# Patient Record
Sex: MALE | Race: WHITE | NOT HISPANIC OR LATINO | Employment: FULL TIME | URBAN - METROPOLITAN AREA
[De-identification: names, ages, dates, MRNs, and addresses within clinical notes are randomized per-mention and may not be internally consistent; named-entity substitution may affect disease eponyms.]

---

## 2017-05-22 ENCOUNTER — PATIENT OUTREACH (OUTPATIENT)
Dept: ADMINISTRATIVE | Facility: HOSPITAL | Age: 52
End: 2017-05-22

## 2017-06-01 ENCOUNTER — OFFICE VISIT (OUTPATIENT)
Dept: INTERNAL MEDICINE | Facility: CLINIC | Age: 52
End: 2017-06-01
Payer: COMMERCIAL

## 2017-06-01 VITALS — HEIGHT: 71 IN | TEMPERATURE: 98 F | WEIGHT: 180.31 LBS | BODY MASS INDEX: 25.24 KG/M2

## 2017-06-01 DIAGNOSIS — Z12.11 COLON CANCER SCREENING: ICD-10-CM

## 2017-06-01 DIAGNOSIS — J30.9 ALLERGIC RHINITIS, UNSPECIFIED ALLERGIC RHINITIS TRIGGER, UNSPECIFIED RHINITIS SEASONALITY: ICD-10-CM

## 2017-06-01 DIAGNOSIS — Z00.00 ROUTINE GENERAL MEDICAL EXAMINATION AT A HEALTH CARE FACILITY: Primary | ICD-10-CM

## 2017-06-01 DIAGNOSIS — A60.00 GENITAL HERPES SIMPLEX, UNSPECIFIED SITE: ICD-10-CM

## 2017-06-01 DIAGNOSIS — Z12.5 PROSTATE CANCER SCREENING: ICD-10-CM

## 2017-06-01 PROCEDURE — 99999 PR PBB SHADOW E&M-EST. PATIENT-LVL II: CPT | Mod: PBBFAC,,, | Performed by: FAMILY MEDICINE

## 2017-06-01 PROCEDURE — 99396 PREV VISIT EST AGE 40-64: CPT | Mod: S$GLB,,, | Performed by: FAMILY MEDICINE

## 2017-06-01 RX ORDER — FLUTICASONE PROPIONATE 50 MCG
2 SPRAY, SUSPENSION (ML) NASAL DAILY
Qty: 1 BOTTLE | Refills: 11 | Status: SHIPPED | OUTPATIENT
Start: 2017-06-01 | End: 2019-08-13 | Stop reason: SDUPTHER

## 2017-06-01 RX ORDER — FLUTICASONE PROPIONATE 50 MCG
2 SPRAY, SUSPENSION (ML) NASAL DAILY
Qty: 1 BOTTLE | Refills: 0 | Status: SHIPPED | OUTPATIENT
Start: 2017-06-01 | End: 2017-06-01 | Stop reason: SDUPTHER

## 2017-06-01 RX ORDER — VALACYCLOVIR HYDROCHLORIDE 500 MG/1
500 TABLET, FILM COATED ORAL 2 TIMES DAILY
Qty: 60 TABLET | Refills: 11 | Status: SHIPPED | OUTPATIENT
Start: 2017-06-01 | End: 2019-08-13 | Stop reason: DRUGHIGH

## 2017-06-01 NOTE — PROGRESS NOTES
Subjective:      Patient ID: Flex Curtis is a 51 y.o. male.    Chief Complaint: Annual Exam    Patient is a 51-year-old white male coming in today to establish care as well as to have a prevention exam and to do an eye doctor physical.  He has a history of seasonal ALLERGIES which are controlled with Zyrtec and Flonase as well as genital herpes which are controlled on a when necessary basis with Valtrex.  He also has a history of low HDL cholesterol.  He normally does a wellness visit with his [facility] physical.  He recently did some bloodwork that was nonfasting which showed a total cholesterol 176 HDL 23 triglycerides at 286 and LDL at 96 of glucose at 1:15 a BMI 24 blood pressure 118/84.  They currently have an adopted son but they're looking to get a girl between the ages of 47.  He works currently as a .  He is needing to update a colonoscopy.        Lab Results   Component Value Date    WBC 5.45 09/03/2015    HGB 14.3 09/03/2015    HCT 42.0 09/03/2015     09/03/2015    CHOL 173 09/03/2015    TRIG 151 (H) 09/03/2015    HDL 36 (L) 09/03/2015    ALT 18 09/03/2015    AST 15 09/03/2015     09/03/2015    K 3.6 09/03/2015     09/03/2015    CREATININE 1.2 09/03/2015    BUN 20 09/03/2015    CO2 26 09/03/2015    TSH 1.581 09/03/2015    HGBA1C 5.3 09/03/2015       Review of Systems   Constitutional: Negative for chills, fatigue and unexpected weight change.   HENT: Negative for congestion, ear pain, postnasal drip, sneezing, sore throat and trouble swallowing.    Eyes: Negative for pain and visual disturbance.   Respiratory: Negative for cough and shortness of breath.    Cardiovascular: Negative for chest pain and leg swelling.   Gastrointestinal: Negative for abdominal pain, constipation, diarrhea, nausea and vomiting.   Endocrine: Negative for cold intolerance and heat intolerance.   Genitourinary: Negative for difficulty urinating, dysuria and flank pain.    Musculoskeletal: Negative for arthralgias, back pain, joint swelling and neck pain.   Skin: Negative for color change and rash.   Neurological: Negative for dizziness, seizures and headaches.   Psychiatric/Behavioral: Negative for behavioral problems and dysphoric mood.     Objective:     Physical Exam   Constitutional: He is oriented to person, place, and time. He appears well-developed and well-nourished. No distress.   HENT:   Head: Normocephalic and atraumatic.   Right Ear: External ear normal.   Left Ear: External ear normal.   Nose: Nose normal.   Mouth/Throat: Oropharynx is clear and moist.   Eyes: EOM are normal. Pupils are equal, round, and reactive to light.   Neck: Normal range of motion. Neck supple. No thyromegaly present.   Cardiovascular: Normal rate and regular rhythm.  Exam reveals no gallop and no friction rub.    No murmur heard.  Pulmonary/Chest: Effort normal and breath sounds normal. No respiratory distress.   Abdominal: Soft. Bowel sounds are normal. He exhibits no distension. There is no tenderness. There is no rebound.   Musculoskeletal: Normal range of motion.   Lymphadenopathy:     He has no cervical adenopathy.   Neurological: He is alert and oriented to person, place, and time. Coordination normal.   Skin: Skin is warm and dry.   Psychiatric: He has a normal mood and affect.   Vitals reviewed.    Assessment:     1. Routine general medical examination at a health care facility    2. Colon cancer screening    3. Prostate cancer screening    4. Allergic rhinitis, unspecified allergic rhinitis trigger, unspecified rhinitis seasonality    5. Genital herpes simplex, unspecified site      Plan:   Flex was seen today for annual exam.    Diagnoses and all orders for this visit:    Routine general medical examination at a health care facility-discussed health maintenance issues completed form okay to proceed forward the adoption process we'll obtain lab work next week work on HDL.  -     Lipid  panel; Future  -     Comprehensive metabolic panel; Future  -     TSH; Future  -     CBC auto differential; Future  -     PSA, Screening; Future  -     Case request GI: COLONOSCOPY    Colon cancer screening-schedule colonoscopy  -     Case request GI: COLONOSCOPY    Prostate cancer screening  -     PSA, Screening; Future    Recurrent ALLERGIES-refilled Flonase and getting Zyrtec over-the-counter.    Genital herpes with when necessary outbreaks has Valtrex to use as needed.  -     Discontinue: fluticasone (FLONASE) 50 mcg/actuation nasal spray; 2 sprays by Each Nare route once daily.  -     fluticasone (FLONASE) 50 mcg/actuation nasal spray; 2 sprays by Each Nare route once daily.    Other orders  -     valacyclovir (VALTREX) 500 MG tablet; Take 1 tablet (500 mg total) by mouth 2 (two) times daily.            Return in about 1 year (around 6/1/2018) for physical.

## 2017-08-18 ENCOUNTER — HOSPITAL ENCOUNTER (OUTPATIENT)
Facility: HOSPITAL | Age: 52
Discharge: HOME | End: 2017-08-18
Attending: INTERNAL MEDICINE | Admitting: INTERNAL MEDICINE
Payer: COMMERCIAL

## 2017-08-18 ENCOUNTER — ANESTHESIA (OUTPATIENT)
Dept: ENDOSCOPY | Facility: HOSPITAL | Age: 52
End: 2017-08-18
Payer: COMMERCIAL

## 2017-08-18 ENCOUNTER — ANESTHESIA EVENT (OUTPATIENT)
Dept: ENDOSCOPY | Facility: HOSPITAL | Age: 52
End: 2017-08-18
Payer: COMMERCIAL

## 2017-08-18 ENCOUNTER — SURGERY (OUTPATIENT)
Age: 52
End: 2017-08-18

## 2017-08-18 VITALS
HEIGHT: 71 IN | WEIGHT: 173 LBS | HEART RATE: 69 BPM | TEMPERATURE: 98 F | SYSTOLIC BLOOD PRESSURE: 117 MMHG | DIASTOLIC BLOOD PRESSURE: 80 MMHG | RESPIRATION RATE: 18 BRPM | BODY MASS INDEX: 24.22 KG/M2 | OXYGEN SATURATION: 98 %

## 2017-08-18 DIAGNOSIS — Z12.12 SCREENING FOR COLORECTAL CANCER: ICD-10-CM

## 2017-08-18 DIAGNOSIS — Z12.11 SCREENING FOR COLORECTAL CANCER: ICD-10-CM

## 2017-08-18 PROCEDURE — 63600175 PHARM REV CODE 636 W HCPCS: Performed by: NURSE ANESTHETIST, CERTIFIED REGISTERED

## 2017-08-18 PROCEDURE — 88305 TISSUE EXAM BY PATHOLOGIST: CPT | Performed by: PATHOLOGY

## 2017-08-18 PROCEDURE — 37000008 HC ANESTHESIA 1ST 15 MINUTES: Performed by: INTERNAL MEDICINE

## 2017-08-18 PROCEDURE — 27201012 HC FORCEPS, HOT/COLD, DISP: Performed by: INTERNAL MEDICINE

## 2017-08-18 PROCEDURE — 45380 COLONOSCOPY AND BIOPSY: CPT | Mod: 33,,, | Performed by: INTERNAL MEDICINE

## 2017-08-18 PROCEDURE — 25000003 PHARM REV CODE 250: Performed by: INTERNAL MEDICINE

## 2017-08-18 PROCEDURE — 37000009 HC ANESTHESIA EA ADD 15 MINS: Performed by: INTERNAL MEDICINE

## 2017-08-18 PROCEDURE — 45380 COLONOSCOPY AND BIOPSY: CPT | Performed by: INTERNAL MEDICINE

## 2017-08-18 PROCEDURE — 25000003 PHARM REV CODE 250: Performed by: NURSE ANESTHETIST, CERTIFIED REGISTERED

## 2017-08-18 PROCEDURE — 88305 TISSUE EXAM BY PATHOLOGIST: CPT | Mod: 26,,, | Performed by: PATHOLOGY

## 2017-08-18 RX ORDER — SODIUM CHLORIDE, SODIUM LACTATE, POTASSIUM CHLORIDE, CALCIUM CHLORIDE 600; 310; 30; 20 MG/100ML; MG/100ML; MG/100ML; MG/100ML
INJECTION, SOLUTION INTRAVENOUS CONTINUOUS PRN
Status: DISCONTINUED | OUTPATIENT
Start: 2017-08-18 | End: 2017-08-18

## 2017-08-18 RX ORDER — SODIUM CHLORIDE, SODIUM LACTATE, POTASSIUM CHLORIDE, CALCIUM CHLORIDE 600; 310; 30; 20 MG/100ML; MG/100ML; MG/100ML; MG/100ML
INJECTION, SOLUTION INTRAVENOUS CONTINUOUS
Status: DISCONTINUED | OUTPATIENT
Start: 2017-08-18 | End: 2017-08-18 | Stop reason: HOSPADM

## 2017-08-18 RX ORDER — LIDOCAINE HCL/PF 100 MG/5ML
SYRINGE (ML) INTRAVENOUS
Status: DISCONTINUED | OUTPATIENT
Start: 2017-08-18 | End: 2017-08-18

## 2017-08-18 RX ORDER — PROPOFOL 10 MG/ML
INJECTION, EMULSION INTRAVENOUS
Status: DISCONTINUED | OUTPATIENT
Start: 2017-08-18 | End: 2017-08-18

## 2017-08-18 RX ADMIN — PROPOFOL 30 MG: 10 INJECTION, EMULSION INTRAVENOUS at 09:08

## 2017-08-18 RX ADMIN — SODIUM CHLORIDE, SODIUM LACTATE, POTASSIUM CHLORIDE, AND CALCIUM CHLORIDE: .6; .31; .03; .02 INJECTION, SOLUTION INTRAVENOUS at 09:08

## 2017-08-18 RX ADMIN — SODIUM CHLORIDE, SODIUM LACTATE, POTASSIUM CHLORIDE, AND CALCIUM CHLORIDE: 600; 310; 30; 20 INJECTION, SOLUTION INTRAVENOUS at 09:08

## 2017-08-18 RX ADMIN — PROPOFOL 140 MG: 10 INJECTION, EMULSION INTRAVENOUS at 09:08

## 2017-08-18 RX ADMIN — LIDOCAINE HYDROCHLORIDE 100 MG: 20 INJECTION, SOLUTION INTRAVENOUS at 09:08

## 2017-08-18 NOTE — ANESTHESIA POSTPROCEDURE EVALUATION
"Anesthesia Post Evaluation    Patient: Flex Curtis    Procedure(s) Performed: Procedure(s) (LRB):  COLONOSCOPY (N/A)    Final Anesthesia Type: MAC  Patient location during evaluation: PACU  Patient participation: Yes- Able to Participate  Level of consciousness: awake and alert and oriented  Post-procedure vital signs: reviewed and stable  Pain management: adequate  Airway patency: patent  PONV status at discharge: No PONV  Anesthetic complications: no      Cardiovascular status: blood pressure returned to baseline  Respiratory status: unassisted, room air and spontaneous ventilation  Hydration status: euvolemic  Follow-up not needed.        Visit Vitals  /62 (BP Location: Left arm, Patient Position: Lying)   Pulse 73   Temp 36.6 °C (97.9 °F) (Oral)   Resp 16   Ht 5' 11" (1.803 m)   Wt 78.5 kg (173 lb)   SpO2 95%   BMI 24.13 kg/m²       Pain/Yara Score: Pain Assessment Performed: Yes (8/18/2017  9:09 AM)  Presence of Pain: non-verbal indicators absent (8/18/2017  9:51 AM)  Yara Score: 8 (8/18/2017  9:50 AM)      "

## 2017-08-18 NOTE — DISCHARGE SUMMARY
[facility] -   Brief Operative Note     SUMMARY     Surgery Date: 8/18/2017     Surgeon(s) and Role:     * Garett Leija MD - Primary    Assisting Surgeon: None    Pre-op Diagnosis:  Routine general medical examination at a health care facility [Z00.00]  Colon cancer screening [Z12.11]    Post-op Diagnosis:  Post-Op Diagnosis Codes:     * Routine general medical examination at a health care facility [Z00.00]     * Colon cancer screening [Z12.11]    Procedure(s) (LRB):  COLONOSCOPY (N/A)    Anesthesia: Choice    Description of the findings of the procedure: Procedure completed. See Procedure note for details.     Findings/Key Components: Procedure completed. See Procedure note for details.     Prosthesis/Implants: None    Estimated Blood Loss: less than 10         Specimens:   Specimen (12h ago through future)    Start     Ordered    08/18/17 0937  Specimen to Pathology - Surgery  Once     Comments:  1. Colon Polyp      08/18/17 0945          Discharge Note    SUMMARY     Admit Date: 8/18/2017    Discharge Date and Time:  08/18/2017 9:48 AM    Hospital Course (synopsis of major diagnoses, care, treatment, and services provided during the course of the hospital stay): Procedure completed. See Procedure note for details.      Final Diagnosis: Post-Op Diagnosis Codes:     * Routine general medical examination at a health care facility [Z00.00]     * Colon cancer screening [Z12.11]    Disposition: Home or Self Care    Follow Up/Patient Instructions:     Medications:  Reconciled Home Medications:   Current Discharge Medication List      CONTINUE these medications which have NOT CHANGED    Details   CETIRIZINE HCL (ZYRTEC ORAL) Take by mouth.      fluticasone (FLONASE) 50 mcg/actuation nasal spray 2 sprays by Each Nare route once daily.  Qty: 1 Bottle, Refills: 11    Associated Diagnoses: Allergic rhinitis, unspecified allergic rhinitis trigger, unspecified rhinitis seasonality      valacyclovir  (VALTREX) 500 MG tablet Take 1 tablet (500 mg total) by mouth 2 (two) times daily.  Qty: 60 tablet, Refills: 11             Discharge Procedure Orders  Diet general     Activity as tolerated       Follow-up Information     Tracy Panda MD.    Specialty:  Family Medicine  Contact information:  [address]  [address]  [place] [address] [phone]

## 2017-08-18 NOTE — ANESTHESIA RELEASE NOTE
"Anesthesia Release from PACU Note    Patient: Flex Curtis    Procedure(s) Performed: Procedure(s) (LRB):  COLONOSCOPY (N/A)    Anesthesia type: MAC    Post pain: Adequate analgesia    Post assessment: no apparent anesthetic complications, tolerated procedure well and no evidence of recall    Last Vitals:   Visit Vitals  /62 (BP Location: Left arm, Patient Position: Lying)   Pulse 73   Temp 36.6 °C (97.9 °F) (Oral)   Resp 16   Ht 5' 11" (1.803 m)   Wt 78.5 kg (173 lb)   SpO2 95%   BMI 24.13 kg/m²       Post vital signs: stable    Level of consciousness: awake    Nausea/Vomiting: no nausea/no vomiting    Complications: none    Airway Patency: patent    Respiratory: unassisted, spontaneous ventilation, room air    Cardiovascular: stable    Hydration: euvolemic  "

## 2017-08-18 NOTE — TRANSFER OF CARE
"Anesthesia Transfer of Care Note    Patient: Flex Curtis    Procedure(s) Performed: Procedure(s) (LRB):  COLONOSCOPY (N/A)    Patient location: PACU    Anesthesia Type: MAC    Transport from OR: Transported from OR on room air with adequate spontaneous ventilation    Post pain: adequate analgesia    Post assessment: no apparent anesthetic complications    Post vital signs: stable    Level of consciousness: awake    Nausea/Vomiting: no nausea/vomiting    Complications: none    Transfer of care protocol was followed      Last vitals:   Visit Vitals  /62 (BP Location: Left arm, Patient Position: Lying)   Pulse 73   Temp 36.6 °C (97.9 °F) (Oral)   Resp 16   Ht 5' 11" (1.803 m)   Wt 78.5 kg (173 lb)   SpO2 95%   BMI 24.13 kg/m²     "

## 2017-08-18 NOTE — ANESTHESIA PREPROCEDURE EVALUATION
08/18/2017  Flex Curtis is a 52 y.o., male.    Anesthesia Evaluation    I have reviewed the Patient Summary Reports.    I have reviewed the Nursing Notes.   I have reviewed the Medications.     Review of Systems  Anesthesia Hx:  Hx of Anesthetic complications ponv but not with recent or prophylactic intervention History of prior surgery of interest to airway management or planning: Previous anesthesia: General Denies Family Hx of Anesthesia complications.  Personal Hx of Anesthesia complications, Post-Operative Nausea/Vomiting, in the past, but not with recent anesthetics / prophylaxis   Social:  Non-Smoker    Hematology/Oncology:  Hematology Normal        EENT/Dental:   Seasonal allergies controlled with meds   Cardiovascular:  Cardiovascular Normal  ECG has been reviewed. Negative stress test 2015   Renal/:   Chronic Renal Disease History of kidney stones   Hepatic/GI:   Bowel Prep.    Musculoskeletal:  Musculoskeletal Normal    Neurological:  Neurology Normal    Endocrine:  Endocrine Normal    Dermatological:  Skin Normal    Psych:  Psychiatric Normal           Physical Exam  General:  Well nourished    Airway/Jaw/Neck:  Airway Findings: Mouth Opening: Normal Tongue: Normal  General Airway Assessment: Adult  Mallampati: I  Improves to I with phonation.  TM Distance: 4 - 6 cm  Jaw/Neck Findings:  Neck ROM: Normal ROM      Dental:  Dental Findings: In tactPermanent partial bridges upper and lower        Mental Status:  Mental Status Findings:  Alert and Oriented         Anesthesia Plan  Type of Anesthesia, risks & benefits discussed:  Anesthesia Type:  MAC  Patient's Preference:   Intra-op Monitoring Plan:   Intra-op Monitoring Plan Comments:   Post Op Pain Control Plan:   Post Op Pain Control Plan Comments:   Induction:   IV  Beta Blocker:  Patient is not currently on a Beta-Blocker (No further  documentation required).       Informed Consent: Patient understands risks and agrees with Anesthesia plan.  Questions answered. Anesthesia consent signed with patient.  ASA Score: 1     Day of Surgery Review of History & Physical: I have interviewed and examined the patient. I have reviewed the patient's H&P dated: 8/18/17. There are no significant changes.  H&P update referred to the provider.         Ready For Surgery From Anesthesia Perspective.

## 2017-08-18 NOTE — H&P
Short Stay Endoscopy History and Physical    PCP - Tracy Panda MD    Procedure - Colonoscopy  ASA - 1  Mallampati - per anesthesia  History of Anesthesia problems - no  Family history Anesthesia problems -  no     HPI:  This is a 52 y.o. male here for evaluation of :     Average Risk Screening: yes  High risk screening: No  History of polyps: No  Anemia: No  Blood in stools: No  Diarrhea: No  Abdominal Pain: No    Review of Systems:  CONSTITUTIONAL: Denies weight change,  fatigue, fevers, chills, night sweats.  CARDIOVASCULAR: Denies chest pain, shortness of breath, orthopnea and edema.  RESPIRATORY: Denies cough, hemoptysis, dyspnea, and wheezing.  GI: See HPI.    Medical History:  Past Medical History:   Diagnosis Date    Allergy     Herpes genitalia     Kidney stones        Surgical History:   Past Surgical History:   Procedure Laterality Date    lithotripsey Bilateral        Family History:   Family History   Problem Relation Age of Onset    Cancer Mother      skin cancer    Heart disease Father 70    Dementia Father      Lewy body       Social History:   Social History   Substance Use Topics    Smoking status: Former Smoker     Packs/day: 0.50     Years: 10.00     Types: Cigarettes     Start date: 2/12/1987     Quit date: 2/12/2006    Smokeless tobacco: Never Used    Alcohol use 2.4 oz/week     4 Shots of liquor per week       Allergies: Reviewed.    Medications:  No current facility-administered medications on file prior to encounter.      Current Outpatient Prescriptions on File Prior to Encounter   Medication Sig Dispense Refill    CETIRIZINE HCL (ZYRTEC ORAL) Take by mouth.      fluticasone (FLONASE) 50 mcg/actuation nasal spray 2 sprays by Each Nare route once daily. 1 Bottle 11    valacyclovir (VALTREX) 500 MG tablet Take 1 tablet (500 mg total) by mouth 2 (two) times daily. 60 tablet 11       Physical Exam:  Vital Signs:   Vitals:    08/18/17 0904   BP: 136/81   Pulse: 70   Resp: 19    Temp: 97.9 °F (36.6 °C)     General Appearance: Well appearing in no acute distress  ENT: OP clear  Chest: CTA B  CV: RRR, no m/r/g  Abd: s/nt/nd/nabs  Ext: no edema    Labs:  Reviewed    Impression: Screening    Plan:  I have explained the risks and benefits of colonoscopy to the patient including but not limited to bleeding, perforation, infection, and death. The patient wishes to proceed with colonoscopy.

## 2017-08-18 NOTE — DISCHARGE INSTRUCTIONS
Understanding Colon and Rectal Polyps    The colon (also called the large intestine) is a muscular tube that forms the last part of the digestive tract. It absorbs water and stores food waste. The colon is about 4 to 6 feet long. The rectum is the last 6 inches of the colon. The colon and rectum have a smooth lining composed of millions of cells. Changes in these cells can lead to growths in the colon that can become cancerous and should be removed. Multiple tests are available to screen for colon cancer, but the colonoscopy is the most recommended test. During colonoscopy, these polyps can be removed. How often you need this test depends on many things including your condition, your family history, symptoms, and what the findings were at the previous colonoscopy.   When the colon lining changes  Changes that happen in the cells that line the colon or rectum can lead to growths called polyps. Over a period of years, polyps can turn cancerous. Removing polyps early may prevent cancer from ever forming.  Polyps  Polyps are fleshy clumps of tissue that form on the lining of the colon or rectum. Small polyps are usually benign (not cancerous). However, over time, cells in a polyp can change and become cancerous. Certain types of polyps known as adenomatous polyps are premalignant. The risk for invasive cancer increases with the size of the polyp and certain cell and gene features. This means that they can become cancerous if they're not removed. Hyperplastic polyps are benign. They can grow quite large and not turn cancerous.   Cancer  Almost all colorectal cancers start when polyp cells begin growing abnormally. As a cancerous tumor grows, it may involve more and more of the colon or rectum. In time, cancer can also grow beyond the colon or rectum and spread to nearby organs or to glands called lymph nodes. The cells can also travel to other parts of the body. This is known as metastasis. The earlier a cancerous  tumor is removed, the better the chance of preventing its spread.    Date Last Reviewed: 8/1/2016  © 0231-2755 [employer] [address] [place] [address], [place] [address]. All rights reserved. This information is not intended as a substitute for professional medical care. Always follow your healthcare professional's instructions.        Hemorrhoids    Hemorrhoids are swollen and inflamed veins inside the rectum and near the anus. The rectum is the last several inches of the colon. The anus is the passage between the rectum and the outside of the body.  Causes  The veins can become swollen due to increased pressure in them. This is most often caused by:  · Chronic constipation or diarrhea  · Straining when having a bowel movement  · Sitting too long on the toilet  · A low-fiber diet  · Pregnancy  Symptoms  · Bleeding from the rectum (this may be noticeable after bowel movements)  · Lump near the anus  · Itching around the anus  · Pain around the anus  There are different types of hemorrhoids. Depending on the type you have and the severity, you may be able to treat yourself at home. In some cases, a procedure may be the best treatment option. Your healthcare provider can tell you more about this, if needed.  Home care  General care  · To get relief from pain or itching, try:  ¨ Topical products. Your healthcare provider may prescribe or recommend creams, ointments, or pads that can be applied to the hemorrhoid. Use these exactly as directed.  ¨ Medicines. Your healthcare provider may recommend stool softeners, suppositories, or laxatives to help manage constipation. Use these exactly as directed.  ¨ Sitz baths. A sitz bath involves sitting in a few inches of warm bath water. Be careful not to make the water so hot that you burn yourself--test it before sitting in it. Soak for about 10 to 15 minutes a few times a day. This may help relieve pain.  Tips to help prevent hemorrhoids  · Eat more fiber. Fiber adds  bulk to stool and absorbs water as it moves through your colon. This makes stool softer and easier to pass.  ¨ Increase the fiber in your diet with more fiber-rich foods. These include fresh fruit, vegetables, and whole grains.  ¨ Take a fiber supplement or bulking agent, if advised to by your provider. These include products such as psyllium or methylcellulose.  · Drink plenty of water, if directed to by your provider. This can help keep stool soft.  · Be more active. Frequent exercise aids digestion and helps prevent constipation. It may also help make bowel movements more regular.  · Dont strain during bowel movements. This can make hemorrhoids more likely. Also, dont sit on the toilet for long periods of time.  Follow-up care  Follow up with your healthcare provider, or as advised. If a culture or imaging tests were done, you will be notified of the results when they are ready. This may take a few days or longer.  When to seek medical advice  Call your healthcare provider right away if any of these occur:  · Increased bleeding from the rectum  · Increased pain around the rectum or anus  · Weakness or dizziness  Call 911  Call 911 or return to the emergency department right away if any of these occur:  · Trouble breathing or swallowing  · Fainting or loss of consciousness  · Unusually fast heart rate  · Vomiting blood  · Large amounts of blood in stool  Date Last Reviewed: 6/22/2015  © 9977-1364 [employer]. [address] [place] [address], [place] [address]. All rights reserved. This information is not intended as a substitute for professional medical care. Always follow your healthcare professional's instructions.

## 2017-08-22 ENCOUNTER — TELEPHONE (OUTPATIENT)
Dept: INTERNAL MEDICINE | Facility: CLINIC | Age: 52
End: 2017-08-22

## 2017-08-22 NOTE — LETTER
August 23, 2017        Flex Curtis             [facility]  [address] [place] [address]  Phone: [phone]  Fax: [phone]   Patient: Flex Curtis   MR Number: [id number]   YOB: 1965   Date of Visit: 8/22/2017       To whom it may concern,     Mr. Flex Curtis was seen on 6/1/2017 for adoption physical, establishing care with new pcp, and prevention exam. He was previously under my partner's care (Dr. Mathew Rocha) and changed his PCP to be under my care now. Based on his exam, he is ok to proceed forward with the adoption process without any reservations from a medical standpoint.        If you have questions, please do not hesitate to call me. I look forward to following Flex along with you.    Sincerely,      Tracy Panda MD

## 2017-08-22 NOTE — TELEPHONE ENCOUNTER
Patient states that he has the note from visit and that doesn't help they sent him a email requesting a letter from you stating that your are his pcp and he is under your care and that he will follow up with you. Patient states that he will drop off or attach to [place] the email they sent him.

## 2017-08-22 NOTE — TELEPHONE ENCOUNTER
----- Message from Erin Mcginnis sent at 8/22/2017  2:15 PM CDT -----  Pt saw Dr Panda for a physical for adoption on 6-1-17.  He is needing a statement for the state for the adoption stating Dr Panda is his pcp and under her care and that  he has had  care under Dr Rocha in the past, but now transferred to Dr Panda as his pcp. If you have any questions, please call pt back at [phone] or once it's done.

## 2017-08-23 ENCOUNTER — PATIENT MESSAGE (OUTPATIENT)
Dept: INTERNAL MEDICINE | Facility: CLINIC | Age: 52
End: 2017-08-23

## 2018-01-05 ENCOUNTER — OFFICE VISIT (OUTPATIENT)
Dept: INTERNAL MEDICINE | Facility: CLINIC | Age: 53
End: 2018-01-05
Payer: COMMERCIAL

## 2018-01-05 VITALS
RESPIRATION RATE: 16 BRPM | SYSTOLIC BLOOD PRESSURE: 102 MMHG | HEART RATE: 94 BPM | DIASTOLIC BLOOD PRESSURE: 74 MMHG | HEIGHT: 71 IN | WEIGHT: 182.44 LBS | TEMPERATURE: 99 F | BODY MASS INDEX: 25.54 KG/M2 | OXYGEN SATURATION: 98 %

## 2018-01-05 DIAGNOSIS — J06.9 UPPER RESPIRATORY TRACT INFECTION, UNSPECIFIED TYPE: Primary | ICD-10-CM

## 2018-01-05 PROCEDURE — 99999 PR PBB SHADOW E&M-EST. PATIENT-LVL III: CPT | Mod: PBBFAC,,, | Performed by: PHYSICIAN ASSISTANT

## 2018-01-05 PROCEDURE — 99213 OFFICE O/P EST LOW 20 MIN: CPT | Mod: S$GLB,,, | Performed by: PHYSICIAN ASSISTANT

## 2018-01-05 RX ORDER — PSEUDOEPHEDRINE HCL 120 MG/1
120 TABLET, FILM COATED, EXTENDED RELEASE ORAL DAILY
COMMUNITY
End: 2019-08-13

## 2018-01-05 RX ORDER — BENZONATATE 200 MG/1
CAPSULE ORAL
Qty: 30 CAPSULE | Refills: 1 | Status: SHIPPED | OUTPATIENT
Start: 2018-01-05 | End: 2019-08-13

## 2018-01-06 NOTE — PROGRESS NOTES
Subjective:       Patient ID: Flex Curtis is a 52 y.o.W/ male.    Chief Complaint: Cough; Nasal Congestion; and Chest Congestion    HPI         He comes in today by himself and has the above problem.  He has been sick now for about 1 week and he has been trying to take Sudafed 120 mg, NyQuil, and Zyrtec 10 mg.  None of these seem to be helping his symptoms much.  He has a head cold with head congestion and tightness in his maxillary and bridge of the nose areas, PND, low-grade fever last night, and chest congestion with a lot of frequent coughing.  He didn't get a flu shot earlier this year.  He doesn't have any other problems such as: Fever, chills, rigors, headache, earache, sore throat, trouble swallowing, choking, wheeze, dyspnea of any modality, sweats, diaphoresis, CP, pleurisy, night sweats, or GI symptoms of nausea, anorexia, and vomiting.    Review of Systems   Otherwise negative concerning the ENT, RESPIRATORY, PULMONARY, and GI system review.     Objective:      Physical Exam    ENT: All within normal limits.  There is no redness behind the tympanic membrane.  Canals are normal and he has a normal amount of wax present.  Nose is open and clear without any turbinate redness or edema.  I don't see any rhinorrhea or mucopurulence.  Throat is normal without any swelling of the soft palate or pharynx area.  There is no exudates or halitosis noted.  His glands are not tender and he has no adenopathy present in the neck.  CHEST: Clear BS anterior to posterior.  There is no wheeze, rhonchi, or rales.  He is not in any respiratory distress.  His voice was normal without any nasal tone or hoarseness present.    Assessment:       1. Upper respiratory tract infection, unspecified type        Plan:     1.  Continue using his Sudafed, Zyrtec, and start using Mucinex DM 1200 mg every 12 hours to loosen promote drainage from the chest.  He is to take 6 ounces of water 4 times per day to help make the Mucinex  more efficient.  2.  Start Tessalon Perles 200 mg daily at bedtime to suppress coughing.  3.  Recheck if symptoms persist greater than 7 days.

## 2019-08-13 ENCOUNTER — OFFICE VISIT (OUTPATIENT)
Dept: INTERNAL MEDICINE | Facility: CLINIC | Age: 54
End: 2019-08-13
Payer: COMMERCIAL

## 2019-08-13 VITALS
OXYGEN SATURATION: 98 % | WEIGHT: 183.44 LBS | HEART RATE: 70 BPM | DIASTOLIC BLOOD PRESSURE: 82 MMHG | BODY MASS INDEX: 25.68 KG/M2 | HEIGHT: 71 IN | TEMPERATURE: 98 F | SYSTOLIC BLOOD PRESSURE: 122 MMHG

## 2019-08-13 DIAGNOSIS — Z23 NEED FOR SHINGLES VACCINE: ICD-10-CM

## 2019-08-13 DIAGNOSIS — K40.90 LEFT INGUINAL HERNIA: ICD-10-CM

## 2019-08-13 DIAGNOSIS — S63.501A SPRAIN OF RIGHT WRIST, INITIAL ENCOUNTER: ICD-10-CM

## 2019-08-13 DIAGNOSIS — A60.01 HERPES SIMPLEX INFECTION OF PENIS: ICD-10-CM

## 2019-08-13 DIAGNOSIS — S53.401A SPRAIN OF RIGHT ELBOW, INITIAL ENCOUNTER: ICD-10-CM

## 2019-08-13 DIAGNOSIS — Z00.00 PREVENTATIVE HEALTH CARE: Primary | ICD-10-CM

## 2019-08-13 DIAGNOSIS — J30.1 SEASONAL ALLERGIC RHINITIS DUE TO POLLEN: ICD-10-CM

## 2019-08-13 DIAGNOSIS — Z12.5 SCREENING PSA (PROSTATE SPECIFIC ANTIGEN): ICD-10-CM

## 2019-08-13 DIAGNOSIS — Z13.220 SCREENING FOR LIPID DISORDERS: ICD-10-CM

## 2019-08-13 PROBLEM — R42 VERTIGO: Status: ACTIVE | Noted: 2017-08-19

## 2019-08-13 PROBLEM — H81.12 BENIGN PAROXYSMAL POSITIONAL VERTIGO OF LEFT EAR: Chronic | Status: ACTIVE | Noted: 2017-08-19

## 2019-08-13 PROCEDURE — 99396 PR PREVENTIVE VISIT,EST,40-64: ICD-10-PCS | Mod: S$GLB,,, | Performed by: FAMILY MEDICINE

## 2019-08-13 PROCEDURE — 99999 PR PBB SHADOW E&M-EST. PATIENT-LVL IV: CPT | Mod: PBBFAC,,, | Performed by: FAMILY MEDICINE

## 2019-08-13 PROCEDURE — 99396 PREV VISIT EST AGE 40-64: CPT | Mod: S$GLB,,, | Performed by: FAMILY MEDICINE

## 2019-08-13 PROCEDURE — 99999 PR PBB SHADOW E&M-EST. PATIENT-LVL IV: ICD-10-PCS | Mod: PBBFAC,,, | Performed by: FAMILY MEDICINE

## 2019-08-13 RX ORDER — FLUTICASONE PROPIONATE 50 MCG
2 SPRAY, SUSPENSION (ML) NASAL DAILY
Qty: 3 BOTTLE | Refills: 3 | Status: SHIPPED | OUTPATIENT
Start: 2019-08-13 | End: 2020-11-13 | Stop reason: SDUPTHER

## 2019-08-13 RX ORDER — CETIRIZINE HYDROCHLORIDE 10 MG/1
10 TABLET ORAL DAILY
Qty: 90 TABLET | Refills: 3 | Status: SHIPPED | OUTPATIENT
Start: 2019-08-13 | End: 2020-11-13 | Stop reason: SDUPTHER

## 2019-08-13 RX ORDER — VALACYCLOVIR HYDROCHLORIDE 1 G/1
1000 TABLET, FILM COATED ORAL DAILY
Qty: 15 TABLET | Refills: 1 | Status: SHIPPED | OUTPATIENT
Start: 2019-08-13 | End: 2019-09-09

## 2019-08-13 NOTE — PROGRESS NOTES
CHIEF COMPLAINT  Establish Care and Annual Exam      DIAGNOSES    1.  Preventative health care     HEALTH MAINTENANCE INTERVENTIONS - UP TO DATE  Health Maintenance Topics with due status: Not Due       Topic Last Completion Date    TETANUS VACCINE 09/03/2015    Lipid Panel 09/03/2015    Colonoscopy 08/18/2017       HEALTH MAINTENANCE INTERVENTIONS - DUE OR DUE SOON  Health Maintenance Due   Topic Date Due    Shingles Vaccine (1 of 2) 06/14/2016    Influenza Vaccine (1) 08/01/2019       2.  Sprain of right elbow, initial encounter   3.  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  No acute injury.  Physical exam of right shoulder, elbow, wrist, and hand remarkable for mild discomfort on resisted supination and mild tenderness of medial epicondyle.  Right shoulder, elbow, wrist, and hand otherwise normal to inspection, palpation, strength testing, and ROM testing.  We discussed treatment options.  He agreed to begin a trial of therapy with physical therapy.  He will let me know if this fails to resolve the problem.    4.  Herpes simplex infection of penis        ASSESSMENT:  Chronic problem.  He gets only one or two outbreaks per year.  He wants episodic treatment.    5.  Left inguinal hernia        ASSESSMENT:  He reports painless prominence of left groin for last few years.  Physical exam suggests subtle left inguinal hernia.  He agreed to see surgeon for further evaluation and treatment.    6.  Seasonal allergic rhinitis due to pollen        ASSESSMENT:  Controlled with current treatment.    7.  Need for shingles vaccine        ASSESSMENT:  Ordered.    8.  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  He says he will  schedule executive physical later this year.    9.  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  He says he will schedule executive physical later this year        1. Preventative health care    2. Sprain of right elbow, initial encounter    3. Sprain of right wrist, initial encounter    4. Herpes simplex infection of penis    5. Left inguinal hernia    6. Seasonal allergic rhinitis due to pollen    7. Need for shingles vaccine    8. Screening PSA (prostate specific antigen)    9. Screening for lipid disorders        ORDER SUMMARY  Orders Placed This Encounter    Ambulatory Referral to Physical/Occupational Therapy    Ambulatory Referral to General Surgery    varicella-zoster gE-AS01B, PF, (SHINGRIX) 50 mcg/0.5 mL injection    valACYclovir (VALTREX) 1000 MG tablet    fluticasone propionate (FLONASE) 50 mcg/actuation nasal spray    cetirizine (ZYRTEC) 10 MG tablet       Problem List Items Addressed This Visit        ID    Genital herpes simplex    Relevant Medications    valACYclovir (VALTREX) 1000 MG tablet       GI    Left inguinal hernia    Relevant Orders    Ambulatory Referral to General Surgery       Orthopedic    Sprain of right elbow    Relevant Orders    Ambulatory Referral to Physical/Occupational Therapy    Sprain of right wrist    Relevant Orders    Ambulatory Referral to Physical/Occupational Therapy       Other    Seasonal allergic rhinitis due to pollen (Chronic)    Relevant Medications    fluticasone propionate (FLONASE) 50 mcg/actuation nasal spray    cetirizine (ZYRTEC) 10 MG tablet      Other Visit Diagnoses     Preventative health care    -  Primary    Relevant Medications    varicella-zoster gE-AS01B, PF, (SHINGRIX) 50 mcg/0.5 mL injection    Need for shingles vaccine        Relevant Medications    varicella-zoster gE-AS01B, PF, (SHINGRIX) 50 mcg/0.5 mL injection    Screening PSA (prostate specific antigen)         Screening for lipid disorders              Outpatient Medications Prior to Visit   Medication Sig Dispense Refill    CETIRIZINE HCL (ZYRTEC ORAL) Take 1 tablet by mouth once daily.       fluticasone (FLONASE) 50 mcg/actuation nasal spray 2 sprays by Each Nare route once daily. 1 Bottle 11    benzonatate (TESSALON) 200 MG capsule 1 Q 8 hrs to suppress cough,or if not needed in AM prefer HS dosing only. 30 capsule 1    doxylamin-PSE-DM-acetaminophen (NYQUIL D) 6.25-30- mg/15 mL Liqd Take by mouth as needed.      pseudoephedrine (SUDAFED) 120 mg 12 hr tablet Take 120 mg by mouth once daily.      valacyclovir (VALTREX) 500 MG tablet Take 1 tablet (500 mg total) by mouth 2 (two) times daily. (Patient taking differently: Take 500 mg by mouth as needed. ) 60 tablet 11     No facility-administered medications prior to visit.         Medications Ordered This Encounter   Medications    cetirizine (ZYRTEC) 10 MG tablet     Sig: Take 1 tablet (10 mg total) by mouth once daily.     Dispense:  90 tablet     Refill:  3    fluticasone propionate (FLONASE) 50 mcg/actuation nasal spray     Si sprays (100 mcg total) by Each Nostril route once daily.     Dispense:  3 Bottle     Refill:  3    valACYclovir (VALTREX) 1000 MG tablet     Sig: Take 1 tablet (1,000 mg total) by mouth once daily. - START AT ONSET OF HSV SYMPTOMS for 5 days     Dispense:  15 tablet     Refill:  1    varicella-zoster gE-AS01B, PF, (SHINGRIX) 50 mcg/0.5 mL injection     Sig: Inject 0.5 mL (one dose) into muscle now; give second dose at least 2 months later     Dispense:  0.5 mL     Refill:  1       Medications Discontinued During This Encounter   Medication Reason    benzonatate (TESSALON) 200 MG capsule Patient no longer taking    doxylamin-PSE-DM-acetaminophen (NYQUIL D) 6.25-30- mg/15 mL Liqd Patient no longer taking    pseudoephedrine (SUDAFED) 120 mg 12 hr tablet Patient no longer taking    valacyclovir (VALTREX) 500 MG  "tablet Dose adjustment    fluticasone (FLONASE) 50 mcg/actuation nasal spray Reorder    CETIRIZINE HCL (ZYRTEC ORAL) Reorder        Follow up in about 1 year (around 8/13/2020) for wellness and preventive services.      REVIEW OF SYSTEMS  CONSTITUTIONAL: No fever reported.  CARDIOVASCULAR: No angina reported.  PULMONARY: No hemoptysis reported.  PSYCHIATRIC: No mood instability reported.  NEUROLOGIC: No seizures reported.  ENDOCRINE: No polydipsia reported.  GASTROINTESTINAL: No blood in stool reported.  GENITOURINARY: No hematuria reported.  ENT: No acute hearing changes reported.  OPHTHALMOLOGIC: No acute vision changes reported.  HEMATOLOGIC: No bleeding problems reported.  MUSCULOSKELETAL: No recent injuries reported.  DERMATOLOGIC: No rash reported.  REMAINDER OF COMPLETE REVIEW OF SYSTEMS is negative or noncontributory to present illness except as noted herein.     PHYSICAL EXAM  Vitals:    08/13/19 1506   BP: 122/82   BP Location: Left arm   Patient Position: Sitting   Pulse: 70   Temp: 97.5 °F (36.4 °C)   TempSrc: Tympanic   SpO2: 98%   Weight: 83.2 kg (183 lb 6.8 oz)   Height: 5' 10.87" (1.8 m)     CONSTITUTIONAL: Vital signs noted. No apparent distress. Does not appear acutely ill or septic. Appears adequately hydrated.  EYE: Sclerae anicteric. Lids and conjunctiva unremarkable.  ENT: External ENT unremarkable. Oropharynx moist.  NECK: Trachea midline. Thyroid nontender.  PULM: Lungs clear. Breathing unlabored.  CV: Auscultation reveals regular rate and rhythm without murmur, gallop or rub. No carotid bruit.  GI: Soft and nontender. Bowel sounds normal.  DERM: Skin warm and moist with normal turgor.  PSYCH: Alert and oriented x 3. Mood is grossly neutral. Affect appropriate. Judgment and insight not grossly compromised.      PAST MEDICAL HISTORY  He has a past medical history of Allergy, Benign paroxysmal positional vertigo of left ear, Herpes genitalia, Kidney stones, Seasonal allergic rhinitis due to " pollen, and Vestibular nerve disease, left.    SURGICAL HISTORY  He has a past surgical history that includes Colonoscopy (N/A, 8/18/2017); Lithotripsy (Bilateral); and excision of throat lesion.    FAMILY HISTORY  His family history includes Cancer in his mother; Dementia in his father; Heart disease (age of onset: 70) in his father.     ALLERGIES  Review of patient's allergies indicates:  No Known Allergies    SOCIAL HISTORY   TOBACCO USE HISTORY: He reports that he quit smoking about 13 years ago. His smoking use included cigarettes. He started smoking about 32 years ago. He has a 5.00 pack-year smoking history. He has never used smokeless tobacco.   ALCOHOL USE HISTORY:  He reports that he drinks alcohol.   RECREATIONAL DRUG USE HISTORY:  He reports that he does not use drugs.    ABOUT THIS DOCUMENTATION:  · Documentation entered by me for this encounter was done in part using speech-recognition technology. Although I have made an effort to ensure accuracy, malapropisms may exist and should be interpreted in context.                        NIURKA Preston MD

## 2019-08-21 DIAGNOSIS — Z91.89 AT RISK FOR CORONARY ARTERY DISEASE: ICD-10-CM

## 2019-09-09 ENCOUNTER — HOSPITAL ENCOUNTER (OUTPATIENT)
Dept: RADIOLOGY | Facility: HOSPITAL | Age: 54
Discharge: HOME | End: 2019-09-09
Attending: FAMILY MEDICINE
Payer: COMMERCIAL

## 2019-09-09 ENCOUNTER — CLINICAL SUPPORT (OUTPATIENT)
Dept: INTERNAL MEDICINE | Facility: CLINIC | Age: 54
End: 2019-09-09
Payer: COMMERCIAL

## 2019-09-09 ENCOUNTER — OFFICE VISIT (OUTPATIENT)
Dept: INTERNAL MEDICINE | Facility: CLINIC | Age: 54
End: 2019-09-09
Payer: COMMERCIAL

## 2019-09-09 VITALS
WEIGHT: 183 LBS | RESPIRATION RATE: 14 BRPM | TEMPERATURE: 98 F | HEIGHT: 71 IN | HEART RATE: 73 BPM | BODY MASS INDEX: 25.62 KG/M2 | DIASTOLIC BLOOD PRESSURE: 74 MMHG | SYSTOLIC BLOOD PRESSURE: 115 MMHG

## 2019-09-09 VITALS
DIASTOLIC BLOOD PRESSURE: 74 MMHG | RESPIRATION RATE: 14 BRPM | HEIGHT: 71 IN | BODY MASS INDEX: 25.62 KG/M2 | HEART RATE: 73 BPM | WEIGHT: 183 LBS | SYSTOLIC BLOOD PRESSURE: 115 MMHG

## 2019-09-09 DIAGNOSIS — Z00.00 ROUTINE GENERAL MEDICAL EXAMINATION AT A HEALTH CARE FACILITY: Primary | ICD-10-CM

## 2019-09-09 DIAGNOSIS — Z00.00 ROUTINE HEALTH MAINTENANCE: Primary | ICD-10-CM

## 2019-09-09 DIAGNOSIS — Z91.89 AT RISK FOR CORONARY ARTERY DISEASE: ICD-10-CM

## 2019-09-09 DIAGNOSIS — R93.1 ELEVATED CORONARY ARTERY CALCIUM SCORE: ICD-10-CM

## 2019-09-09 LAB
ALBUMIN SERPL BCP-MCNC: 3.8 G/DL (ref 3.5–5.2)
ALP SERPL-CCNC: 51 U/L (ref 55–135)
ALT SERPL W/O P-5'-P-CCNC: 32 U/L (ref 10–44)
ANION GAP SERPL CALC-SCNC: 10 MMOL/L (ref 8–16)
AST SERPL-CCNC: 21 U/L (ref 10–40)
BILIRUB SERPL-MCNC: 0.6 MG/DL (ref 0.1–1)
BUN SERPL-MCNC: 19 MG/DL (ref 6–20)
CALCIUM SERPL-MCNC: 9.3 MG/DL (ref 8.7–10.5)
CHLORIDE SERPL-SCNC: 105 MMOL/L (ref 95–110)
CHOLEST SERPL-MCNC: 192 MG/DL (ref 120–199)
CHOLEST/HDLC SERPL: 5.6 {RATIO} (ref 2–5)
CO2 SERPL-SCNC: 25 MMOL/L (ref 23–29)
CREAT SERPL-MCNC: 1.1 MG/DL (ref 0.5–1.4)
ERYTHROCYTE [DISTWIDTH] IN BLOOD BY AUTOMATED COUNT: 12.3 % (ref 11.5–14.5)
EST. GFR  (AFRICAN AMERICAN): >60 ML/MIN/1.73 M^2
EST. GFR  (NON AFRICAN AMERICAN): >60 ML/MIN/1.73 M^2
GLUCOSE SERPL-MCNC: 81 MG/DL (ref 70–110)
HCT VFR BLD AUTO: 42 % (ref 40–54)
HDLC SERPL-MCNC: 34 MG/DL (ref 40–75)
HDLC SERPL: 17.7 % (ref 20–50)
HGB BLD-MCNC: 14.6 G/DL (ref 14–18)
LDLC SERPL CALC-MCNC: 124 MG/DL (ref 63–159)
MCH RBC QN AUTO: 31.3 PG (ref 27–31)
MCHC RBC AUTO-ENTMCNC: 34.8 G/DL (ref 32–36)
MCV RBC AUTO: 90 FL (ref 82–98)
NONHDLC SERPL-MCNC: 158 MG/DL
PLATELET # BLD AUTO: 207 K/UL (ref 150–350)
PMV BLD AUTO: 9.8 FL (ref 9.2–12.9)
POTASSIUM SERPL-SCNC: 4.1 MMOL/L (ref 3.5–5.1)
PROT SERPL-MCNC: 6.6 G/DL (ref 6–8.4)
RBC # BLD AUTO: 4.66 M/UL (ref 4.6–6.2)
SODIUM SERPL-SCNC: 140 MMOL/L (ref 136–145)
TRIGL SERPL-MCNC: 170 MG/DL (ref 30–150)
WBC # BLD AUTO: 5.45 K/UL (ref 3.9–12.7)

## 2019-09-09 PROCEDURE — 83036 HEMOGLOBIN GLYCOSYLATED A1C: CPT

## 2019-09-09 PROCEDURE — 84153 ASSAY OF PSA TOTAL: CPT

## 2019-09-09 PROCEDURE — 75571 CT HRT W/O DYE W/CA TEST: CPT | Mod: 26,,, | Performed by: RADIOLOGY

## 2019-09-09 PROCEDURE — 90471 FLU VACCINE (QUAD) GREATER THAN OR EQUAL TO 3YO PRESERVATIVE FREE IM: ICD-10-PCS | Mod: S$GLB,,, | Performed by: FAMILY MEDICINE

## 2019-09-09 PROCEDURE — 90471 IMMUNIZATION ADMIN: CPT | Mod: S$GLB,,, | Performed by: FAMILY MEDICINE

## 2019-09-09 PROCEDURE — 99999 PR PBB SHADOW E&M-EST. PATIENT-LVL III: CPT | Mod: PBBFAC,,, | Performed by: FAMILY MEDICINE

## 2019-09-09 PROCEDURE — 99386 PR PREVENTIVE VISIT,NEW,40-64: ICD-10-PCS | Mod: 25,S$GLB,, | Performed by: FAMILY MEDICINE

## 2019-09-09 PROCEDURE — 90686 FLU VACCINE (QUAD) GREATER THAN OR EQUAL TO 3YO PRESERVATIVE FREE IM: ICD-10-PCS | Mod: S$GLB,,, | Performed by: FAMILY MEDICINE

## 2019-09-09 PROCEDURE — 80053 COMPREHEN METABOLIC PANEL: CPT

## 2019-09-09 PROCEDURE — 80061 LIPID PANEL: CPT

## 2019-09-09 PROCEDURE — 90686 IIV4 VACC NO PRSV 0.5 ML IM: CPT | Mod: S$GLB,,, | Performed by: FAMILY MEDICINE

## 2019-09-09 PROCEDURE — 99386 PREV VISIT NEW AGE 40-64: CPT | Mod: 25,S$GLB,, | Performed by: FAMILY MEDICINE

## 2019-09-09 PROCEDURE — 85027 COMPLETE CBC AUTOMATED: CPT

## 2019-09-09 PROCEDURE — 84443 ASSAY THYROID STIM HORMONE: CPT

## 2019-09-09 PROCEDURE — 75571 CT CALCIUM SCORING CARDIAC: ICD-10-PCS | Mod: 26,,, | Performed by: RADIOLOGY

## 2019-09-09 PROCEDURE — 75571 CT HRT W/O DYE W/CA TEST: CPT | Mod: TC

## 2019-09-09 PROCEDURE — 99999 PR PBB SHADOW E&M-EST. PATIENT-LVL III: ICD-10-PCS | Mod: PBBFAC,,, | Performed by: FAMILY MEDICINE

## 2019-09-09 RX ORDER — NAPROXEN SODIUM 220 MG/1
81 TABLET, FILM COATED ORAL DAILY
Start: 2019-09-09

## 2019-09-09 NOTE — PROGRESS NOTES
Subjective:       Patient ID: Flex Curtis is a 54 y.o. male.    Chief Complaint: exec phys  HPI2nd exec health exam . First with me. No c/o.  r forearm improving w PT    Nl tmst 4 y/a    Past Medical History:   Diagnosis Date    Allergy     Benign paroxysmal positional vertigo of left ear 2017    Herpes genitalia     Kidney stones     Seasonal allergic rhinitis due to pollen 2017    Vestibular nerve disease, left 2017    80% loss     Past Surgical History:   Procedure Laterality Date    COLONOSCOPY N/A 2017    [facility]    excision of throat lesion      LITHOTRIPSY Bilateral      Family History   Problem Relation Age of Onset    Cancer Mother         skin cancer    Heart disease Father 70        mi in his 70s    Dementia Father         Lewy body     Social History     Socioeconomic History    Marital status:      Spouse name: Not on file    Number of children: 1    Years of education: Not on file    Highest education level: Not on file   Occupational History    Occupation:    Social Needs    Financial resource strain: Not hard at all    Food insecurity:     Worry: Never true     Inability: Never true    Transportation needs:     Medical: No     Non-medical: No   Tobacco Use    Smoking status: Former Smoker     Packs/day: 0.50     Years: 10.00     Pack years: 5.00     Types: Cigarettes     Start date: 1987     Last attempt to quit: 2006     Years since quittin.5    Smokeless tobacco: Never Used   Substance and Sexual Activity    Alcohol use: Yes     Alcohol/week: 0.0 oz     Frequency: 2-3 times a week     Drinks per session: 1 or 2     Binge frequency: Never     Comment: Occasional    Drug use: No    Sexual activity: Yes     Partners: Female   Lifestyle    Physical activity:     Days per week: 0 days     Minutes per session: 0 min    Stress: Not on file   Relationships    Social  connections:     Talks on phone: Never     Gets together: Never     Attends Pentecostal service: Not on file     Active member of club or organization: No     Attends meetings of clubs or organizations: Never     Relationship status:    Other Topics Concern    Not on file   Social History Narrative     at [employer]     with one child       Review of Systems   Cardiovascular: no chest pain  Chest: no shortness of breath  Abd: no abd pain  Remainder review of systems negative    Objective:      Physical Exam   Constitutional: He is oriented to person, place, and time. He appears well-developed and well-nourished.   HENT:   Head: Normocephalic and atraumatic.   Right Ear: External ear normal.   Left Ear: External ear normal.   Nose: Nose normal.   Mouth/Throat: Oropharynx is clear and moist.   Nl tms   Eyes: Pupils are equal, round, and reactive to light. Conjunctivae and EOM are normal. No scleral icterus.   Neck: Normal range of motion. Neck supple. Carotid bruit is not present.   Cardiovascular: Normal rate, regular rhythm and normal heart sounds. Exam reveals no gallop and no friction rub.   No murmur heard.  Pulmonary/Chest: Effort normal and breath sounds normal. He has no wheezes.   Abdominal: Soft. Bowel sounds are normal. He exhibits no distension and no mass. There is no hepatosplenomegaly. There is no tenderness. There is no rebound and no guarding.   Musculoskeletal: Normal range of motion. He exhibits no tenderness.   Lymphadenopathy:     He has no cervical adenopathy.   Neurological: He is alert and oriented to person, place, and time. He displays normal reflexes. No cranial nerve deficit. Coordination normal.   Skin: Skin is warm and dry. No rash noted. No erythema.   Psychiatric: He has a normal mood and affect. His behavior is normal. Judgment and thought content normal.   Nursing note and vitals reviewed.    skin no concerning lesions  Assessment:       1. Routine health  maintenance    2. Elevated coronary artery calcium score      famhx cad  Plan:       **d/wd cor rocky score. D/wd card, statin and asa. D/wd side eff statin and benefits. He wants to think about*    He plans shingrix via pharm  Had flu shot today  Has appt surg for ing hernia  Dict pnd rest of lab  CSN:[id number]

## 2019-09-10 LAB
COMPLEXED PSA SERPL-MCNC: 0.78 NG/ML (ref 0–4)
ESTIMATED AVG GLUCOSE: 100 MG/DL (ref 68–131)
HBA1C MFR BLD HPLC: 5.1 % (ref 4–5.6)
TSH SERPL DL<=0.005 MIU/L-ACNC: 1.33 UIU/ML (ref 0.4–4)

## 2019-09-12 ENCOUNTER — OFFICE VISIT (OUTPATIENT)
Dept: SURGERY | Facility: CLINIC | Age: 54
End: 2019-09-12
Payer: COMMERCIAL

## 2019-09-12 VITALS
TEMPERATURE: 98 F | SYSTOLIC BLOOD PRESSURE: 131 MMHG | HEIGHT: 71 IN | DIASTOLIC BLOOD PRESSURE: 84 MMHG | WEIGHT: 185.44 LBS | BODY MASS INDEX: 25.96 KG/M2 | HEART RATE: 79 BPM

## 2019-09-12 DIAGNOSIS — K40.90 LEFT INGUINAL HERNIA: Primary | ICD-10-CM

## 2019-09-12 PROCEDURE — 99999 PR PBB SHADOW E&M-EST. PATIENT-LVL III: ICD-10-PCS | Mod: PBBFAC,,, | Performed by: SURGERY

## 2019-09-12 PROCEDURE — 99204 OFFICE O/P NEW MOD 45 MIN: CPT | Mod: S$GLB,,, | Performed by: SURGERY

## 2019-09-12 PROCEDURE — 99204 PR OFFICE/OUTPT VISIT, NEW, LEVL IV, 45-59 MIN: ICD-10-PCS | Mod: S$GLB,,, | Performed by: SURGERY

## 2019-09-12 PROCEDURE — 99999 PR PBB SHADOW E&M-EST. PATIENT-LVL III: CPT | Mod: PBBFAC,,, | Performed by: SURGERY

## 2019-09-12 NOTE — LETTER
September 12, 2019      CECILIA Preston MD  [address] [place] [address]           [facility]  [address] [place] [address]  Phone: [phone]  Fax: [phone]          Patient: Flex Curtis   MR Number: [id number]   YOB: 1965   Date of Visit: 9/12/2019       Dear Dr. CECILIA Preston:    Thank you for referring Flex Curtis to me for evaluation. Attached you will find relevant portions of my assessment and plan of care.    If you have questions, please do not hesitate to call me. I look forward to following Flex Curtis along with you.    Sincerely,    Clemente Gant MD    Enclosure  CC:  No Recipients    If you would like to receive this communication electronically, please contact [email or web address] or [phone] to request more information on [employer] Link access.    For providers and/or their staff who would like to refer a patient to [facility], please contact us through our one-stop-shop provider referral line, [facility], at [phone].    If you feel you have received this communication in error or would no longer like to receive these types of communications, please e-mail [email or web address]

## 2019-09-12 NOTE — PROGRESS NOTES
Patient ID: Flex Curtis is a 54 y.o. male.    Left inguinal hernia    Chief Complaint: Consult      HPI:  Patient was sent for evaluation of a left inguinal hernia. He says any as a bulge in the left groin for about 5 years.  It is slightly larger over time.  Is slightly more prominent with straining.  He does not cause pain or discomfort.  He presents now to discuss a left inguinal hernia      Review of Systems   Constitutional: Negative for activity change and unexpected weight change.   HENT: Positive for rhinorrhea. Negative for hearing loss and trouble swallowing.    Eyes: Negative for discharge and visual disturbance.   Respiratory: Negative for chest tightness and wheezing.    Cardiovascular: Negative for chest pain and palpitations.   Gastrointestinal: Negative for blood in stool, constipation, diarrhea and vomiting.   Endocrine: Negative for polydipsia and polyuria.   Genitourinary: Negative for difficulty urinating, hematuria and urgency.   Musculoskeletal: Positive for arthralgias. Negative for joint swelling and neck pain.   Neurological: Negative for weakness and headaches.   Psychiatric/Behavioral: Negative for confusion and dysphoric mood.       Current Outpatient Medications   Medication Sig Dispense Refill    cetirizine (ZYRTEC) 10 MG tablet Take 1 tablet (10 mg total) by mouth once daily. 90 tablet 3    fluticasone propionate (FLONASE) 50 mcg/actuation nasal spray 2 sprays (100 mcg total) by Each Nostril route once daily. 3 Bottle 3    aspirin 81 MG Chew Take 1 tablet (81 mg total) by mouth once daily.      varicella-zoster gE-AS01B, PF, (SHINGRIX) 50 mcg/0.5 mL injection Inject 0.5 mL (one dose) into muscle now; give second dose at least 2 months later 0.5 mL 1     No current facility-administered medications for this visit.        Review of patient's allergies indicates:  No Known Allergies    Past Medical History:   Diagnosis Date    Allergy     Benign paroxysmal positional  vertigo of left ear 2017    Elevated coronary artery calcium score     Herpes genitalia     Kidney stones     Seasonal allergic rhinitis due to pollen 2017    Vestibular nerve disease, left 2017    80% loss       Past Surgical History:   Procedure Laterality Date    COLONOSCOPY N/A 2017    [facility]    excision of throat lesion      LITHOTRIPSY Bilateral        Family History   Problem Relation Age of Onset    Cancer Mother         skin cancer    Heart disease Father 70        mi in his 70s    Dementia Father         Lewy body       Social History     Socioeconomic History    Marital status:      Spouse name: Not on file    Number of children: 1    Years of education: Not on file    Highest education level: Not on file   Occupational History    Occupation:    Social Needs    Financial resource strain: Not hard at all    Food insecurity:     Worry: Never true     Inability: Never true    Transportation needs:     Medical: No     Non-medical: No   Tobacco Use    Smoking status: Former Smoker     Packs/day: 0.50     Years: 10.00     Pack years: 5.00     Types: Cigarettes     Start date: 1987     Last attempt to quit: 2006     Years since quittin.5    Smokeless tobacco: Never Used   Substance and Sexual Activity    Alcohol use: Yes     Alcohol/week: 0.0 oz     Frequency: 2-3 times a week     Drinks per session: 1 or 2     Binge frequency: Never     Comment: Occasional    Drug use: No    Sexual activity: Yes     Partners: Female   Lifestyle    Physical activity:     Days per week: 0 days     Minutes per session: 0 min    Stress: Not on file   Relationships    Social connections:     Talks on phone: Never     Gets together: Never     Attends Orthodox service: Not on file     Active member of club or organization: No     Attends meetings of clubs or organizations: Never     Relationship status:     Other Topics Concern    Not on file   Social History Narrative     at [employer]     with one child       Vitals:    09/12/19 0938   BP: 131/84   Pulse: 79   Temp: 98.3 °F (36.8 °C)       Physical Exam   Constitutional: He is oriented to person, place, and time. He appears well-developed and well-nourished. No distress.   HENT:   Head: Normocephalic and atraumatic.   Eyes: Pupils are equal, round, and reactive to light. No scleral icterus.   Neck: Normal range of motion. Neck supple. No tracheal deviation present.   Cardiovascular: Normal rate, regular rhythm, normal heart sounds and intact distal pulses.   Pulmonary/Chest: Effort normal and breath sounds normal.   Abdominal: Soft. Bowel sounds are normal. He exhibits no distension and no mass. There is no tenderness. There is no rebound and no guarding. A hernia (Reducible left inguinal hernia) is present.   Musculoskeletal: Normal range of motion.   Lymphadenopathy:     He has no cervical adenopathy.   Neurological: He is alert and oriented to person, place, and time.   Skin: Skin is warm and dry.   Psychiatric: He has a normal mood and affect. His behavior is normal. Judgment and thought content normal.       Assessment & Plan:      reducible left inguinal hernia    I discussed operative repair using the robotic technique with mesh versus observation.  I stated that over time these hernias usually get larger and become more symptomatic.    The patient would like to think about doing surgery, talk about this with his wife any will let us know if he likes to proceed.    The need for surgery, risks benefits and complications were discussed.  The rationale for using mesh was discussed.  The rationale for the robotic approach was discussed.  Questions were answered.    Risk of hernia repair includes infection, bleeding, mesh infection, testicular atrophy, and pain or numbness in the groin that may be long-lasting.        Prior to any surgical  intervention the patient would need a CBC CMP and EKG

## 2019-09-12 NOTE — PATIENT INSTRUCTIONS
You have a small left inguinal hernia.  It will likely slowly enlarged over time.    I would recommend repair of the inguinal hernia through a robotic approach with mesh.  The rationale for the robotic repair post should is that there is likely to be little to no chronic postoperative pain. This approach also allows us to evaluate the right side and repair a hernia there if 1 is found.    I do surgeries on Tuesdays, Wednesdays and Fridays.    Our office phone # [phone] and [phone]

## 2019-09-25 DIAGNOSIS — Z76.89 ESTABLISHING CARE WITH NEW DOCTOR, ENCOUNTER FOR: Primary | ICD-10-CM

## 2019-10-01 ENCOUNTER — CLINICAL SUPPORT (OUTPATIENT)
Dept: CARDIOLOGY | Facility: CLINIC | Age: 54
End: 2019-10-01
Payer: COMMERCIAL

## 2019-10-01 ENCOUNTER — OFFICE VISIT (OUTPATIENT)
Dept: CARDIOLOGY | Facility: CLINIC | Age: 54
End: 2019-10-01
Payer: COMMERCIAL

## 2019-10-01 VITALS
HEIGHT: 71 IN | DIASTOLIC BLOOD PRESSURE: 78 MMHG | WEIGHT: 182.56 LBS | OXYGEN SATURATION: 98 % | HEART RATE: 68 BPM | SYSTOLIC BLOOD PRESSURE: 120 MMHG | BODY MASS INDEX: 25.56 KG/M2

## 2019-10-01 DIAGNOSIS — Z82.49 FAMILY HISTORY OF ARTERIOSCLEROTIC CARDIOVASCULAR DISEASE: ICD-10-CM

## 2019-10-01 DIAGNOSIS — R93.1 ELEVATED CORONARY ARTERY CALCIUM SCORE: ICD-10-CM

## 2019-10-01 DIAGNOSIS — Z76.89 ESTABLISHING CARE WITH NEW DOCTOR, ENCOUNTER FOR: ICD-10-CM

## 2019-10-01 DIAGNOSIS — R94.31 NONSPECIFIC ABNORMAL ELECTROCARDIOGRAM (ECG) (EKG): ICD-10-CM

## 2019-10-01 DIAGNOSIS — E78.1 HIGH TRIGLYCERIDES: ICD-10-CM

## 2019-10-01 PROCEDURE — 99204 OFFICE O/P NEW MOD 45 MIN: CPT | Mod: S$GLB,,, | Performed by: INTERNAL MEDICINE

## 2019-10-01 PROCEDURE — 99999 PR PBB SHADOW E&M-EST. PATIENT-LVL III: CPT | Mod: PBBFAC,,, | Performed by: INTERNAL MEDICINE

## 2019-10-01 PROCEDURE — 99999 PR PBB SHADOW E&M-EST. PATIENT-LVL III: ICD-10-PCS | Mod: PBBFAC,,, | Performed by: INTERNAL MEDICINE

## 2019-10-01 PROCEDURE — 99204 PR OFFICE/OUTPT VISIT, NEW, LEVL IV, 45-59 MIN: ICD-10-PCS | Mod: S$GLB,,, | Performed by: INTERNAL MEDICINE

## 2019-10-01 RX ORDER — FLUTICASONE PROPIONATE 50 MCG
SPRAY, SUSPENSION (ML) NASAL
COMMUNITY
Start: 2015-06-04 | End: 2020-08-31 | Stop reason: SDUPTHER

## 2019-10-01 NOTE — PROGRESS NOTES
Subjective:   Patient ID:  Flex Curtis is a 54 y.o. male who presents for follow-up of Consult  Pt with Ca score 312.Patient denies CP, angina or anginal equivalent.Stress test 2015 normal.  EKG shows possible septal scar.    CRF- age, family history    HPI    Review of Systems   Constitution: Negative. Negative for weight gain.   HENT: Negative.    Eyes: Negative.    Cardiovascular: Negative.  Negative for chest pain, leg swelling and palpitations.   Respiratory: Negative.  Negative for shortness of breath.    Endocrine: Negative.    Hematologic/Lymphatic: Negative.    Skin: Negative.    Musculoskeletal: Negative for muscle weakness.   Gastrointestinal: Negative.    Genitourinary: Negative.    Neurological: Negative.  Negative for dizziness.   Psychiatric/Behavioral: Negative.    Allergic/Immunologic: Negative.      Family History   Problem Relation Age of Onset    Cancer Mother         skin cancer    Heart disease Father 70        mi in his 70s    Dementia Father         Lewy body     Past Medical History:   Diagnosis Date    Allergy     Benign paroxysmal positional vertigo of left ear 8/19/2017    Elevated coronary artery calcium score     Herpes genitalia     Kidney stones     Seasonal allergic rhinitis due to pollen 6/1/2017    Vestibular nerve disease, left 2017    80% loss     Social History     Socioeconomic History    Marital status:      Spouse name: Not on file    Number of children: 1    Years of education: Not on file    Highest education level: Not on file   Occupational History    Occupation:    Social Needs    Financial resource strain: Not hard at all    Food insecurity:     Worry: Never true     Inability: Never true    Transportation needs:     Medical: No     Non-medical: No   Tobacco Use    Smoking status: Former Smoker     Packs/day: 0.50     Years: 10.00     Pack years: 5.00     Types: Cigarettes     Start date: 2/12/1987     Last attempt  to quit: 2006     Years since quittin.6    Smokeless tobacco: Never Used   Substance and Sexual Activity    Alcohol use: Yes     Alcohol/week: 0.0 standard drinks     Frequency: 2-3 times a week     Drinks per session: 1 or 2     Binge frequency: Never     Comment: Occasional    Drug use: No    Sexual activity: Yes     Partners: Female   Lifestyle    Physical activity:     Days per week: 0 days     Minutes per session: 0 min    Stress: Not on file   Relationships    Social connections:     Talks on phone: Never     Gets together: Never     Attends Zoroastrianism service: Not on file     Active member of club or organization: No     Attends meetings of clubs or organizations: Never     Relationship status:    Other Topics Concern    Not on file   Social History Narrative     at [employer]     with one child     Current Outpatient Medications on File Prior to Visit   Medication Sig Dispense Refill    aspirin 81 MG Chew Take 1 tablet (81 mg total) by mouth once daily.      cetirizine (ZYRTEC) 10 MG tablet Take 1 tablet (10 mg total) by mouth once daily. 90 tablet 3    fluticasone propionate (FLONASE) 50 mcg/actuation nasal spray 2 sprays (100 mcg total) by Each Nostril route once daily. 3 Bottle 3    fluticasone propionate (FLONASE) 50 mcg/actuation nasal spray PLACE 1 SPRAY IN EACH NOSTRIL DAILY      varicella-zoster gE-AS01B, PF, (SHINGRIX) 50 mcg/0.5 mL injection Inject 0.5 mL (one dose) into muscle now; give second dose at least 2 months later (Patient not taking: Reported on 10/1/2019) 0.5 mL 1     No current facility-administered medications on file prior to visit.      Review of patient's allergies indicates:  No Known Allergies    Objective:     Physical Exam   Constitutional: He is oriented to person, place, and time. He appears well-developed and well-nourished.   HENT:   Head: Normocephalic and atraumatic.   Eyes: Pupils are equal, round, and reactive to light.  Conjunctivae are normal.   Neck: Normal range of motion. Neck supple.   Cardiovascular: Normal rate, regular rhythm, normal heart sounds and intact distal pulses.   Pulmonary/Chest: Effort normal and breath sounds normal.   Abdominal: Soft. Bowel sounds are normal.   Neurological: He is alert and oriented to person, place, and time.   Skin: Skin is warm and dry.   Psychiatric: He has a normal mood and affect.   Nursing note and vitals reviewed.      Assessment:     1. Family history of arteriosclerotic cardiovascular disease    2. Elevated coronary artery calcium score        Plan:     Family history of arteriosclerotic cardiovascular disease    Elevated coronary artery calcium score      Stress echo

## 2019-10-01 NOTE — LETTER
October 12, 2019      Bill Goodwin MD  [address]  [place] [address]           [facility]  [address]  [facility] [place] [address]  Phone: [phone]  Fax: [phone]          Patient: Flex Curtis   MR Number: [id number]   YOB: 1965   Date of Visit: 10/1/2019       Dear Dr. Bill Goodwin:    Thank you for referring Flex Curtis to me for evaluation. Attached you will find relevant portions of my assessment and plan of care.    If you have questions, please do not hesitate to call me. I look forward to following Flex Curtis along with you.    Sincerely,    Bonifacio Galvan MD    Enclosure  CC:  No Recipients    If you would like to receive this communication electronically, please contact [email or web address] or [phone] to request more information on [employer] access.    For providers and/or their staff who would like to refer a patient to [facility], please contact us through our one-stop-shop provider referral line, [facility], at [phone].    If you feel you have received this communication in error or would no longer like to receive these types of communications, please e-mail [email or web address]

## 2020-08-31 ENCOUNTER — TELEPHONE (OUTPATIENT)
Dept: INTERNAL MEDICINE | Facility: CLINIC | Age: 55
End: 2020-08-31

## 2020-08-31 NOTE — TELEPHONE ENCOUNTER
----- Message from Adriana Wadsworth sent at 8/31/2020  7:20 AM CDT -----  Regarding: appt  Contact: patient  Pt called at 7:19, having difficulti logging into my chart, please call him b ack at [phone]

## 2020-08-31 NOTE — TELEPHONE ENCOUNTER
Spoke with pt regarding Video Visit pt stated that he was having difficulties with his internet and would like an Audio Visit. Pt is schedule for 9/2/2020 @10:40.//LB

## 2020-09-02 ENCOUNTER — OFFICE VISIT (OUTPATIENT)
Dept: INTERNAL MEDICINE | Facility: CLINIC | Age: 55
End: 2020-09-02
Payer: COMMERCIAL

## 2020-09-02 ENCOUNTER — TELEPHONE (OUTPATIENT)
Dept: INTERNAL MEDICINE | Facility: CLINIC | Age: 55
End: 2020-09-02

## 2020-09-02 DIAGNOSIS — F41.1 GENERALIZED ANXIETY DISORDER: ICD-10-CM

## 2020-09-02 DIAGNOSIS — F33.0 MILD EPISODE OF RECURRENT MAJOR DEPRESSIVE DISORDER: Primary | ICD-10-CM

## 2020-09-02 PROCEDURE — 99213 PR OFFICE/OUTPT VISIT, EST, LEVL III, 20-29 MIN: ICD-10-PCS | Mod: 95,,, | Performed by: FAMILY MEDICINE

## 2020-09-02 PROCEDURE — 99213 OFFICE O/P EST LOW 20 MIN: CPT | Mod: 95,,, | Performed by: FAMILY MEDICINE

## 2020-09-02 RX ORDER — BUPROPION HYDROCHLORIDE 150 MG/1
150 TABLET ORAL DAILY
Qty: 30 TABLET | Refills: 1 | Status: SHIPPED | OUTPATIENT
Start: 2020-09-02 | End: 2020-09-08 | Stop reason: SDUPTHER

## 2020-09-02 NOTE — PROGRESS NOTES
"TELEMEDICINE AUDIO-ONLY VISIT    CHIEF COMPLAINT  Diabetes      DIAGNOSES, HISTORY, ASSESSMENT, AND PLAN  Assessment   1. Mild episode of recurrent major depressive disorder    2. Generalized anxiety disorder       SEEING COUNSELOR: Juan Carlos Shahid LCSW  · Marital stress.   · 12 year old son with anger issues.  · Lack of focus.  · History of porn excess.  · Eating junk food.  · Working remotely.  · Worsening over last year, especially since pandemic.  · No suicidal thoughts.  · Drinks 4 days a week. 12-pack or less per week.       Plan   Problem List Items Addressed This Visit     Mild episode of recurrent major depressive disorder - Primary    Generalized anxiety disorder          Outpatient Medications Prior to Visit   Medication Sig Dispense Refill    aspirin 81 MG Chew Take 1 tablet (81 mg total) by mouth once daily.      cetirizine (ZYRTEC) 10 MG tablet Take 1 tablet (10 mg total) by mouth once daily. 90 tablet 3    fluticasone propionate (FLONASE) 50 mcg/actuation nasal spray 2 sprays (100 mcg total) by Each Nostril route once daily. 3 Bottle 3    varicella-zoster gE-AS01B, PF, (SHINGRIX) 50 mcg/0.5 mL injection Inject 0.5 mL (one dose) into muscle now; give second dose at least 2 months later (Patient not taking: Reported on 10/1/2019) 0.5 mL 1     No facility-administered medications prior to visit.         There are no discontinued medications.          Additional instructions were reviewed with him. Refer to "Patient Instructions" section of after visit summary.     Follow up in about 3 weeks (around 9/23/2020) for re-evaluate problem(s) discussed today, wellness and preventive services.     Subjective   Review of Systems   Respiratory: Negative for cough and chest tightness.    Cardiovascular: Negative for chest pain.   Psychiatric/Behavioral: Positive for decreased concentration, dysphoric mood and sleep disturbance. Negative for agitation, confusion, hallucinations, self-injury and suicidal ideas. " "The patient is nervous/anxious. The patient is not hyperactive.         Objective   CONSTITUTIONAL: No vocal distress apparent.  PULMONARY: Easily speaks in full sentences.  PSYCHIATRIC: Sounds alert. Conversant and grossly oriented. Tone of voice and speech content reflect neutral but anxious mood and appropriate affect. Judgment and insight grossly intact.     Documentation entered by me for this encounter may have been done in part using speech-recognition technology. Although I have made an effort to ensure accuracy, "sound like" errors may exist and should be interpreted in context. -CECILIA Preston MD.    Visit Details: Services were provided via voice-only telephone call instead of synchronous audio and video virtual visit due to technical difficulties on the part of the patient and patient preference. Emiliano chose and consented to receive these medical services by via voice-only telephone call. This service was not originating from a related E/M service provided within the previous 7 days nor will  to an E/M service or procedure within the next 24 hours or my soonest available appointment.  Prevailing standard of care was able to be met in this audio-only visit. Emiliano represented that his location at the time of this visit was in the [facility]. TOTAL TIME SPENT WITH PATIENT:  21-30 minutes (CPT 64256).   "

## 2020-09-02 NOTE — TELEPHONE ENCOUNTER
Spoke with pt and he is calling to check on Audio Visit .Informed him that  would be with him shortly.//LB

## 2020-09-02 NOTE — PATIENT INSTRUCTIONS
Emiliano Martinez.    It was nice talking with you today. I'm sorry that you're experiencing such stress now. I'm glad you're seeing a counselor regularly.    Someone from [facility] office will be contacting you soon to help you schedule your Executive Health exam. I told them to wait until late September to schedule it so we could see how you are doing on the bupropion. Send me a message if the [facility] staff haven't reached you by Monday.    I look forward to seeing you at your next appointment. Let me know if you need anything in the meantime.    Thanks for letting me care for you, and thanks for trusting [facility] with your healthcare needs.    Sincerely,    CECILIA Preston MD      Medications Ordered This Encounter   Medications    buPROPion (WELLBUTRIN XL) 150 MG TB24 tablet     Sig: Take 1 tablet (150 mg total) by mouth once daily.     Dispense:  30 tablet     Refill:  1

## 2020-09-02 NOTE — TELEPHONE ENCOUNTER
----- Message from Suzette Elias sent at 9/2/2020 10:39 AM CDT -----  Regarding: virtual  Type:  Needs Medical Advice    Who Called: pt  Symptoms (please be specific): na/   How long has patient had these symptoms:  n/a  Pharmacy name and phone #:  n/a  Would the patient rather a call back or a response via [facility]? Call back  Best Call Back Number: [phone]  Additional Information: Please call back./Thanks

## 2020-09-03 DIAGNOSIS — Z00.00 ROUTINE GENERAL MEDICAL EXAMINATION AT A HEALTH CARE FACILITY: Primary | ICD-10-CM

## 2020-09-08 DIAGNOSIS — F33.0 MILD EPISODE OF RECURRENT MAJOR DEPRESSIVE DISORDER: ICD-10-CM

## 2020-09-08 DIAGNOSIS — F41.1 GENERALIZED ANXIETY DISORDER: ICD-10-CM

## 2020-09-08 RX ORDER — BUPROPION HYDROCHLORIDE 150 MG/1
150 TABLET ORAL DAILY
Qty: 30 TABLET | Refills: 1 | Status: SHIPPED | OUTPATIENT
Start: 2020-09-08 | End: 2020-09-30 | Stop reason: DRUGHIGH

## 2020-09-08 NOTE — TELEPHONE ENCOUNTER
Spoke with patient and he stated that he was out of town recently and accidentally left his Buproprion and needs a replacement sent in.

## 2020-09-08 NOTE — TELEPHONE ENCOUNTER
----- Message from Annia Short sent at 9/8/2020  7:22 AM CDT -----  ..Type:  Patient Returning Call    Who Called: pt   Who Left Message for Patient:  Does the patient know what this is regarding?:medication replacement   Would the patient rather a call back or a response via [employer]?  Call back   Best Call Back Number:[phone]  Additional Information: Pt is requesting a call from nurse to get a replacement prescription bupropion

## 2020-09-08 NOTE — TELEPHONE ENCOUNTER
REFILL APPROVED    Medications Ordered This Encounter   Medications    buPROPion (WELLBUTRIN XL) 150 MG TB24 tablet     Sig: Take 1 tablet (150 mg total) by mouth once daily.     Dispense:  30 tablet     Refill:  1     Please fill without delay. This is to replace his previously dispensed supply of this medication, which he lost.

## 2020-09-30 ENCOUNTER — HOSPITAL ENCOUNTER (OUTPATIENT)
Dept: RADIOLOGY | Facility: HOSPITAL | Age: 55
Discharge: HOME | End: 2020-09-30
Attending: FAMILY MEDICINE
Payer: COMMERCIAL

## 2020-09-30 ENCOUNTER — TELEPHONE (OUTPATIENT)
Dept: ORTHOPEDICS | Facility: CLINIC | Age: 55
End: 2020-09-30

## 2020-09-30 ENCOUNTER — CLINICAL SUPPORT (OUTPATIENT)
Dept: INTERNAL MEDICINE | Facility: CLINIC | Age: 55
End: 2020-09-30
Payer: COMMERCIAL

## 2020-09-30 ENCOUNTER — OFFICE VISIT (OUTPATIENT)
Dept: INTERNAL MEDICINE | Facility: CLINIC | Age: 55
End: 2020-09-30
Payer: COMMERCIAL

## 2020-09-30 ENCOUNTER — HOSPITAL ENCOUNTER (OUTPATIENT)
Dept: CARDIOLOGY | Facility: HOSPITAL | Age: 55
Discharge: HOME | End: 2020-09-30
Attending: FAMILY MEDICINE
Payer: COMMERCIAL

## 2020-09-30 VITALS
HEIGHT: 71 IN | TEMPERATURE: 96 F | HEART RATE: 75 BPM | OXYGEN SATURATION: 97 % | BODY MASS INDEX: 24.39 KG/M2 | WEIGHT: 174.19 LBS | DIASTOLIC BLOOD PRESSURE: 79 MMHG | SYSTOLIC BLOOD PRESSURE: 119 MMHG

## 2020-09-30 DIAGNOSIS — Z00.00 PREVENTATIVE HEALTH CARE: Primary | ICD-10-CM

## 2020-09-30 DIAGNOSIS — L72.0 EPIDERMOID CYST OF FINGER OF RIGHT HAND: ICD-10-CM

## 2020-09-30 DIAGNOSIS — L72.0 EPIDERMOID CYST OF FINGER OF RIGHT HAND: Primary | ICD-10-CM

## 2020-09-30 DIAGNOSIS — F33.0 MILD EPISODE OF RECURRENT MAJOR DEPRESSIVE DISORDER: ICD-10-CM

## 2020-09-30 DIAGNOSIS — E78.2 MODERATE MIXED HYPERLIPIDEMIA NOT REQUIRING STATIN THERAPY: Chronic | ICD-10-CM

## 2020-09-30 DIAGNOSIS — Z00.00 ROUTINE GENERAL MEDICAL EXAMINATION AT A HEALTH CARE FACILITY: Primary | ICD-10-CM

## 2020-09-30 DIAGNOSIS — F51.04 PSYCHOPHYSIOLOGIC INSOMNIA: ICD-10-CM

## 2020-09-30 DIAGNOSIS — F41.1 GENERALIZED ANXIETY DISORDER: ICD-10-CM

## 2020-09-30 DIAGNOSIS — Z00.00 ROUTINE GENERAL MEDICAL EXAMINATION AT A HEALTH CARE FACILITY: ICD-10-CM

## 2020-09-30 PROBLEM — R94.31 NONSPECIFIC ABNORMAL ELECTROCARDIOGRAM (ECG) (EKG): Status: RESOLVED | Noted: 2019-10-01 | Resolved: 2020-09-30

## 2020-09-30 LAB
ALBUMIN SERPL BCP-MCNC: 4.2 G/DL (ref 3.5–5.2)
ALP SERPL-CCNC: 59 U/L (ref 55–135)
ALT SERPL W/O P-5'-P-CCNC: 20 U/L (ref 10–44)
ANION GAP SERPL CALC-SCNC: 10 MMOL/L (ref 8–16)
AST SERPL-CCNC: 17 U/L (ref 10–40)
BILIRUB SERPL-MCNC: 0.5 MG/DL (ref 0.1–1)
BUN SERPL-MCNC: 20 MG/DL (ref 6–20)
CALCIUM SERPL-MCNC: 9.1 MG/DL (ref 8.7–10.5)
CHLORIDE SERPL-SCNC: 107 MMOL/L (ref 95–110)
CHOLEST SERPL-MCNC: 183 MG/DL (ref 120–199)
CHOLEST/HDLC SERPL: 4.8 {RATIO} (ref 2–5)
CO2 SERPL-SCNC: 25 MMOL/L (ref 23–29)
COMPLEXED PSA SERPL-MCNC: 0.82 NG/ML (ref 0–4)
CREAT SERPL-MCNC: 1.2 MG/DL (ref 0.5–1.4)
ERYTHROCYTE [DISTWIDTH] IN BLOOD BY AUTOMATED COUNT: 12.3 % (ref 11.5–14.5)
EST. GFR  (AFRICAN AMERICAN): >60 ML/MIN/1.73 M^2
EST. GFR  (NON AFRICAN AMERICAN): >60 ML/MIN/1.73 M^2
ESTIMATED AVG GLUCOSE: 97 MG/DL (ref 68–131)
GLUCOSE SERPL-MCNC: 84 MG/DL (ref 70–110)
HBA1C MFR BLD HPLC: 5 % (ref 4–5.6)
HCT VFR BLD AUTO: 45.9 % (ref 40–54)
HDLC SERPL-MCNC: 38 MG/DL (ref 40–75)
HDLC SERPL: 20.8 % (ref 20–50)
HGB BLD-MCNC: 15.3 G/DL (ref 14–18)
LDLC SERPL CALC-MCNC: 110.4 MG/DL (ref 63–159)
MCH RBC QN AUTO: 30.8 PG (ref 27–31)
MCHC RBC AUTO-ENTMCNC: 33.3 G/DL (ref 32–36)
MCV RBC AUTO: 92 FL (ref 82–98)
NONHDLC SERPL-MCNC: 145 MG/DL
PLATELET # BLD AUTO: 238 K/UL (ref 150–350)
PMV BLD AUTO: 9.6 FL (ref 9.2–12.9)
POTASSIUM SERPL-SCNC: 4.2 MMOL/L (ref 3.5–5.1)
PROT SERPL-MCNC: 6.8 G/DL (ref 6–8.4)
RBC # BLD AUTO: 4.97 M/UL (ref 4.6–6.2)
SODIUM SERPL-SCNC: 142 MMOL/L (ref 136–145)
TRIGL SERPL-MCNC: 173 MG/DL (ref 30–150)
TSH SERPL DL<=0.005 MIU/L-ACNC: 1.18 UIU/ML (ref 0.4–4)
WBC # BLD AUTO: 5.3 K/UL (ref 3.9–12.7)

## 2020-09-30 PROCEDURE — 99386 PR PREVENTIVE VISIT,NEW,40-64: ICD-10-PCS | Mod: S$GLB,,, | Performed by: FAMILY MEDICINE

## 2020-09-30 PROCEDURE — G0008 ADMIN INFLUENZA VIRUS VAC: HCPCS | Mod: S$GLB,,, | Performed by: FAMILY MEDICINE

## 2020-09-30 PROCEDURE — 93005 ELECTROCARDIOGRAM TRACING: CPT

## 2020-09-30 PROCEDURE — 73140 X-RAY EXAM OF FINGER(S): CPT | Mod: 26,RT,, | Performed by: RADIOLOGY

## 2020-09-30 PROCEDURE — 93010 ELECTROCARDIOGRAM REPORT: CPT | Mod: ,,, | Performed by: INTERNAL MEDICINE

## 2020-09-30 PROCEDURE — 93010 EKG 12-LEAD: ICD-10-PCS | Mod: ,,, | Performed by: INTERNAL MEDICINE

## 2020-09-30 PROCEDURE — 99999 PR PBB SHADOW E&M-EST. PATIENT-LVL V: CPT | Mod: PBBFAC,,, | Performed by: FAMILY MEDICINE

## 2020-09-30 PROCEDURE — 90686 FLU VACCINE (QUAD) GREATER THAN OR EQUAL TO 3YO PRESERVATIVE FREE IM: ICD-10-PCS | Mod: S$GLB,,, | Performed by: FAMILY MEDICINE

## 2020-09-30 PROCEDURE — 73140 X-RAY EXAM OF FINGER(S): CPT | Mod: TC,RT

## 2020-09-30 PROCEDURE — 3008F PR BODY MASS INDEX (BMI) DOCUMENTED: ICD-10-PCS | Mod: CPTII,S$GLB,, | Performed by: FAMILY MEDICINE

## 2020-09-30 PROCEDURE — 3008F BODY MASS INDEX DOCD: CPT | Mod: CPTII,S$GLB,, | Performed by: FAMILY MEDICINE

## 2020-09-30 PROCEDURE — G0008 FLU VACCINE (QUAD) GREATER THAN OR EQUAL TO 3YO PRESERVATIVE FREE IM: ICD-10-PCS | Mod: S$GLB,,, | Performed by: FAMILY MEDICINE

## 2020-09-30 PROCEDURE — 84153 ASSAY OF PSA TOTAL: CPT

## 2020-09-30 PROCEDURE — 99999 PR PBB SHADOW E&M-EST. PATIENT-LVL V: ICD-10-PCS | Mod: PBBFAC,,, | Performed by: FAMILY MEDICINE

## 2020-09-30 PROCEDURE — 73140 XR FINGER 2 OR MORE VIEWS RIGHT: ICD-10-PCS | Mod: 26,RT,, | Performed by: RADIOLOGY

## 2020-09-30 PROCEDURE — 86803 HEPATITIS C AB TEST: CPT

## 2020-09-30 PROCEDURE — 80053 COMPREHEN METABOLIC PANEL: CPT

## 2020-09-30 PROCEDURE — 80061 LIPID PANEL: CPT

## 2020-09-30 PROCEDURE — 99386 PREV VISIT NEW AGE 40-64: CPT | Mod: S$GLB,,, | Performed by: FAMILY MEDICINE

## 2020-09-30 PROCEDURE — 85027 COMPLETE CBC AUTOMATED: CPT

## 2020-09-30 PROCEDURE — 90686 IIV4 VACC NO PRSV 0.5 ML IM: CPT | Mod: S$GLB,,, | Performed by: FAMILY MEDICINE

## 2020-09-30 PROCEDURE — 83036 HEMOGLOBIN GLYCOSYLATED A1C: CPT

## 2020-09-30 PROCEDURE — 84443 ASSAY THYROID STIM HORMONE: CPT

## 2020-09-30 RX ORDER — CLONAZEPAM 0.5 MG/1
.25-.5 TABLET ORAL NIGHTLY PRN
Qty: 30 TABLET | Refills: 2 | Status: SHIPPED | OUTPATIENT
Start: 2020-09-30 | End: 2021-03-08 | Stop reason: SDUPTHER

## 2020-09-30 RX ORDER — BUPROPION HYDROCHLORIDE 300 MG/1
300 TABLET ORAL DAILY
Qty: 90 TABLET | Refills: 4 | Status: SHIPPED | OUTPATIENT
Start: 2020-09-30 | End: 2021-12-03

## 2020-09-30 NOTE — PATIENT INSTRUCTIONS
Emiliano Martinez.    We want you to have a good virtual visit experience, so please:  1. Prepare for your virtual visit by following the instructions found at [email or web address].  a. Watch the video (if you haven't seen it before), and then click on the [employer] VIRTUAL VISIT button for instructions how to prepare for your visit.  2. Check-in for your appointment 10-15 minutes early to allow time to troubleshoot any technical problems.  3. Call the [facility] Patient Support Team at [phone] if you need help getting ready for your virtual visit or checking in for a virtual visit.    We encourage you to use headphones or earbuds during the visit to make it easier to hear and be heard.    Remember that the law requires that you be in the state [facility] at the time of your virtual visit, and you need to be in a safe environment. For example, you can't be driving during your virtual visit. If you are driving when the doctor joins you in the virtual visit, your appointment may have to be postponed or rescheduled.    Thanks for letting us care for you, and thanks for trusting [facility] with your healthcare needs.    Sincerely,    CATALINA Preston MD

## 2020-09-30 NOTE — PROGRESS NOTES
DIAGNOSES  1. Epidermoid cyst of right index finger        ORDERS  Orders Placed This Encounter    X-Ray Finger 2 or More Views Right

## 2020-09-30 NOTE — LETTER
Dear Emiliano,    Thank you for allowing me to care for you and perform your Executive Health exam on 09/30/2020.    This letter and the accompanying report will serve as a summary of the medical history you provided, your physical exam findings, and your test results.    Thanks for letting me care for you, and thanks for trusting [facility] with your healthcare needs.    I look forward to seeing you at your next appointment. Until then, take care, and be well.    Warmest regards,  -   CECILIA Preston MD    EXECUTIVE HEALTH ENCOUNTER      REASON FOR VISIT  EXECUTIVE HEALTH    HEALTH MAINTENANCE INTERVENTIONS - UP TO DATE  Health Maintenance Topics with due status: Not Due       Topic Last Completion Date    TETANUS VACCINE 09/03/2015    Colorectal Cancer Screening 08/18/2017    Lipid Panel 09/30/2020       HEALTH MAINTENANCE INTERVENTIONS - DUE OR DUE SOON  Health Maintenance Due   Topic Date Due    Influenza Vaccine (1) 08/01/2020       PCP (Primary Care Provider)  Emiliano identifies me as his PCP.    ISSUES  The following issues were identified and addressed.  1. Preventative health care    2. Psychophysiologic insomnia    3. Generalized anxiety disorder    4. Mild episode of recurrent major depressive disorder    5. Epidermoid cyst of right index finger    6. Moderate mixed hyperlipidemia        Problem List Items Addressed This Visit     Moderate mixed hyperlipidemia (Chronic)    Current Assessment & Plan     Lab Results   Component Value Date    CHOL 183 09/30/2020    CHOL 192 09/09/2019    TRIG 173 (H) 09/30/2020    TRIG 170 (H) 09/09/2019    HDL 38 (L) 09/30/2020    HDL 34 (L) 09/09/2019    LDLCALC 110.4 09/30/2020    LDLCALC 124.0 09/09/2019    NONHDLCHOL 145 09/30/2020    NONHDLCHOL 158 09/09/2019    AST 17 09/30/2020    ALT 20 09/30/2020     The 10-year ASCVD risk score (Christina IVORY Jr., et al., 2013) is: 5.2%    Values used to calculate the score:      Age: 55 years      Sex: Male      Is Non-   American: No      Diabetic: No      Tobacco smoker: No      Systolic Blood Pressure: 119 mmHg      Is BP treated: No      HDL Cholesterol: 38 mg/dL      Total Cholesterol: 183 mg/dL  Therapeutic lifestyle changes encouraged.         Mild episode of recurrent major depressive disorder    Overview     THERAPIST: Juan Carlos Shahid LCSW         Current Assessment & Plan     Exacerbated by COVID-19 pandemic and work-life stressors. Modest improvement with bupropion 150 mg daily. He reports that he is tolerating the medication well and reports preceiving no adverse side-effects.          Relevant Medications    buPROPion (WELLBUTRIN XL) 300 MG 24 hr tablet    Generalized anxiety disorder    Overview     THERAPIST: Juan Carlos Shahid LCSW         Current Assessment & Plan     Exacerbated by COVID-19 pandemic and work-life stressors. Seeing LCSW weekly for CBT.         Relevant Medications    buPROPion (WELLBUTRIN XL) 300 MG 24 hr tablet    clonazePAM (KLONOPIN) 0.5 MG tablet    Psychophysiologic insomnia    Current Assessment & Plan     Primarily difficult maintaining sleep due to anxiety. Worse due to COVID-19 pandemic and work-life stressors. Seeing LCSW for CBT. We discussed treatment options.         Relevant Medications    clonazePAM (KLONOPIN) 0.5 MG tablet    Epidermoid cyst of right index finger    Current Assessment & Plan     Chronic (many years), but growing recently. Not painful. See accompanying clinical photo.         Relevant Orders    Ambulatory referral/consult to Orthopedics      Other Visit Diagnoses     Preventative health care    -  Primary          DATA SUMMARY    LABORATORY:    CBC: The complete blood count (CBC) checks for anemia and measures the red blood cells, white blood cells, and platelets in your blood.  o YOUR RESULTS: NORMAL. No further evaluation or treatment required.     CMP: The comprehensive metabolic panel (CMP) checks kidney function, liver function, sodium, potassium, glucose (sugar)  "and other aspects of your metabolism.  o YOUR RESULTS: NORMAL. No further evaluation or treatment required.     Lipid Panel: The lipid panel measures the levels of different types of fatty substances in your blood (cholesterol and triglycerides) that can contribute to plaque buildup in your arteries (atherosclerosis).  o YOUR RESULTS: Your lipid (cholesterol) panel shows that your cholesterol levels are NOT GREAT, but not bad enough to start a medicine at this point. For now, I recommend working on a heart-healthy lifestyle. You can learn more about a heart-healthy lifestyle at the website of the American Heart Association, www.heart.org.     TSH: The thyroid is a gland in the neck that helps regulate metabolism. The thyroid stimulating hormone (TSH) is a measure of your thyroid activity.  o YOUR RESULTS: NORMAL. No further evaluation or treatment required.     A1c: The hemoglobin A1c (A1c) is a test that evaluates and screens for diabetes.  o YOUR RESULTS: NORMAL. No further evaluation or treatment required.     Hepatitis C Antibody: This test screens for infection (past or present) with the Hepatitis C Virus.  o YOUR RESULTS: NORMAL. No further evaluation or treatment required.    CARDIOPULMONARY:   Electrocardiogram: The electrocardiogram (also referred to as ECG or EKG) is a non-invasive test that measures the electrical activity of the heart's conduction system.  o YOUR RESULTS: NORMAL. No further evaluation or treatment required.      CANCER SCREENING:   PSA: Prostate specific antigen (PSA) is a test used to screen for and monitor prostate cancer in men.  o YOUR RESULTS: NORMAL. No further evaluation or treatment required.      PHYSICAL EXAM  His body mass index is 24.29 kg/m². His  height is 5' 11" (1.803 m) and weight is 79 kg (174 lb 2.6 oz). His tympanic temperature is 96.1 °F (35.6 °C). His blood pressure is 119/79 and his pulse is 75. His oxygen saturation is 97%.    Physical Exam  Vitals signs " reviewed.   Constitutional:       General: He is not in acute distress.     Appearance: Normal appearance.   Eyes:      General: No scleral icterus.     Conjunctiva/sclera: Conjunctivae normal.   Neck:      Musculoskeletal: No muscular tenderness.      Vascular: No carotid bruit.   Cardiovascular:      Rate and Rhythm: Normal rate and regular rhythm.      Heart sounds: Normal heart sounds.   Pulmonary:      Effort: Pulmonary effort is normal. No respiratory distress.      Breath sounds: Normal breath sounds. No wheezing or rhonchi.   Abdominal:      General: Bowel sounds are normal. There is no distension.      Palpations: Abdomen is soft. There is no mass.      Tenderness: There is no abdominal tenderness.   Lymphadenopathy:      Cervical: No cervical adenopathy.   Skin:     General: Skin is warm and dry.      Coloration: Skin is not jaundiced.   Neurological:      General: No focal deficit present.      Mental Status: He is alert and oriented to person, place, and time.   Psychiatric:         Mood and Affect: Mood normal.         Behavior: Behavior normal.         Judgment: Judgment normal.     -    MEDICATIONS  He has a current medication list which includes the following prescription(s): cetirizine, fluticasone propionate, aspirin, bupropion, clonazepam, and varicella-zoster ge-as01b (pf).  Medications Discontinued During This Encounter   Medication Reason    buPROPion (WELLBUTRIN XL) 150 MG TB24 tablet Dose adjustment     Medications Ordered This Encounter   Medications    buPROPion (WELLBUTRIN XL) 300 MG 24 hr tablet     Sig: Take 1 tablet (300 mg total) by mouth once daily.     Dispense:  90 tablet     Refill:  4    clonazePAM (KLONOPIN) 0.5 MG tablet     Sig: Take 0.5-1 tablets (0.25-0.5 mg total) by mouth nightly as needed for Anxiety.     Dispense:  30 tablet     Refill:  2       REVIEW OF SYSTEMS  Review of Systems   Constitutional: Negative for chills and fever.   HENT: Negative for ear pain and  "trouble swallowing.    Eyes: Negative for pain and visual disturbance.   Respiratory: Negative for chest tightness and shortness of breath.    Cardiovascular: Negative for chest pain and palpitations.   Gastrointestinal: Negative for abdominal pain and blood in stool.   Endocrine: Negative for polydipsia and polyuria.   Genitourinary: Negative.  Negative for difficulty urinating, dysuria and hematuria.   Musculoskeletal: Negative for gait problem and joint swelling.   Integumentary:  Negative for rash and wound.   Neurological: Negative for vertigo and syncope.   Hematological: Negative.    Psychiatric/Behavioral: Positive for dysphoric mood. Negative for agitation, confusion, self-injury and suicidal ideas. The patient is nervous/anxious.        FOLLOW-UP  He is to follow up with me for any "Health Maintenance Interventions - Due Or Due Soon" listed above, for any unresolved issues addressed during this encounter, and for any new complaints or concerns.    PAST MEDICAL HISTORY  He has a past medical history of Allergy, Benign paroxysmal positional vertigo of left ear, Elevated coronary artery calcium score, Herpes genitalia, Kidney stones, Moderate mixed hyperlipidemia not requiring statin therapy, Seasonal allergic rhinitis due to pollen, and Vestibular nerve disease, left.    SURGICAL HISTORY  He has a past surgical history that includes Colonoscopy (N/A, 8/18/2017); Lithotripsy (Bilateral); and excision of throat lesion.    FAMILY HISTORY  His family history includes Cancer in his mother; Dementia in his father; Heart disease (age of onset: 70) in his father.     ALLERGIES  He has No Known Allergies.    SOCIAL HISTORY   TOBACCO USE HISTORY: He reports that he quit smoking about 14 years ago. His smoking use included cigarettes. He started smoking about 33 years ago. He has a 5.00 pack-year smoking history. He has never used smokeless tobacco.   ALCOHOL USE HISTORY:  He reports current alcohol " "use.    Documentation entered by me for this encounter may have been done in part using speech-recognition technology. Although I have made an effort to ensure accuracy, "sound like" errors may exist and should be interpreted in context. -CECILIA Preston MD.     "

## 2020-09-30 NOTE — PROGRESS NOTES
Dear Emiliano,    Thank you for allowing me to care for you and perform your Executive Health exam on 09/30/2020.    This letter and the accompanying report will serve as a summary of the medical history you provided, your physical exam findings, and your test results.    Thanks for letting me care for you, and thanks for trusting [facility] with your healthcare needs.    I look forward to seeing you at your next appointment. Until then, take care, and be well.    Warmest regards,     CECILIA Preston MD    EXECUTIVE HEALTH ENCOUNTER      REASON FOR VISIT  EXECUTIVE HEALTH    HEALTH MAINTENANCE INTERVENTIONS - UP TO DATE  Health Maintenance Topics with due status: Not Due       Topic Last Completion Date    TETANUS VACCINE 09/03/2015    Colorectal Cancer Screening 08/18/2017    Lipid Panel 09/30/2020       HEALTH MAINTENANCE INTERVENTIONS - DUE OR DUE SOON  Health Maintenance Due   Topic Date Due    Influenza Vaccine (1) 08/01/2020       PCP (Primary Care Provider)  Emiliano identifies me as his PCP.    ISSUES  The following issues were identified and addressed.  1. Preventative health care    2. Psychophysiologic insomnia    3. Generalized anxiety disorder    4. Mild episode of recurrent major depressive disorder    5. Epidermoid cyst of right index finger    6. Moderate mixed hyperlipidemia        Problem List Items Addressed This Visit     Moderate mixed hyperlipidemia (Chronic)    Current Assessment & Plan     Lab Results   Component Value Date    CHOL 183 09/30/2020    CHOL 192 09/09/2019    TRIG 173 (H) 09/30/2020    TRIG 170 (H) 09/09/2019    HDL 38 (L) 09/30/2020    HDL 34 (L) 09/09/2019    LDLCALC 110.4 09/30/2020    LDLCALC 124.0 09/09/2019    NONHDLCHOL 145 09/30/2020    NONHDLCHOL 158 09/09/2019    AST 17 09/30/2020    ALT 20 09/30/2020     The 10-year ASCVD risk score (Christina IVORY Jr., et al., 2013) is: 5.2%    Values used to calculate the score:      Age: 55 years      Sex: Male      Is Non-   American: No      Diabetic: No      Tobacco smoker: No      Systolic Blood Pressure: 119 mmHg      Is BP treated: No      HDL Cholesterol: 38 mg/dL      Total Cholesterol: 183 mg/dL  Therapeutic lifestyle changes encouraged.         Mild episode of recurrent major depressive disorder    Overview     THERAPIST: Juan Carlos Shahid LCSW         Current Assessment & Plan     Exacerbated by COVID-19 pandemic and work-life stressors. Modest improvement with bupropion 150 mg daily. He reports that he is tolerating the medication well and reports preceiving no adverse side-effects.          Relevant Medications    buPROPion (WELLBUTRIN XL) 300 MG 24 hr tablet    Generalized anxiety disorder    Overview     THERAPIST: Juan Carlos Shahid LCSW         Current Assessment & Plan     Exacerbated by COVID-19 pandemic and work-life stressors. Seeing LCSW weekly for CBT.         Relevant Medications    buPROPion (WELLBUTRIN XL) 300 MG 24 hr tablet    clonazePAM (KLONOPIN) 0.5 MG tablet    Psychophysiologic insomnia    Current Assessment & Plan     Primarily difficult maintaining sleep due to anxiety. Worse due to COVID-19 pandemic and work-life stressors. Seeing LCSW for CBT. We discussed treatment options.         Relevant Medications    clonazePAM (KLONOPIN) 0.5 MG tablet    Epidermoid cyst of right index finger    Current Assessment & Plan     Chronic (many years), but growing recently. Not painful. See accompanying clinical photo.         Relevant Orders    Ambulatory referral/consult to Orthopedics      Other Visit Diagnoses     Preventative health care    -  Primary          DATA SUMMARY    LABORATORY:    CBC: The complete blood count (CBC) checks for anemia and measures the red blood cells, white blood cells, and platelets in your blood.  o YOUR RESULTS: NORMAL. No further evaluation or treatment required.     CMP: The comprehensive metabolic panel (CMP) checks kidney function, liver function, sodium, potassium, glucose (sugar)  "and other aspects of your metabolism.  o YOUR RESULTS: NORMAL. No further evaluation or treatment required.     Lipid Panel: The lipid panel measures the levels of different types of fatty substances in your blood (cholesterol and triglycerides) that can contribute to plaque buildup in your arteries (atherosclerosis).  o YOUR RESULTS: Your lipid (cholesterol) panel shows that your cholesterol levels are NOT GREAT, but not bad enough to start a medicine at this point. For now, I recommend working on a heart-healthy lifestyle. You can learn more about a heart-healthy lifestyle at the website of the American Heart Association, www.heart.org.     TSH: The thyroid is a gland in the neck that helps regulate metabolism. The thyroid stimulating hormone (TSH) is a measure of your thyroid activity.  o YOUR RESULTS: NORMAL. No further evaluation or treatment required.     A1c: The hemoglobin A1c (A1c) is a test that evaluates and screens for diabetes.  o YOUR RESULTS: NORMAL. No further evaluation or treatment required.     Hepatitis C Antibody: This test screens for infection (past or present) with the Hepatitis C Virus.  o YOUR RESULTS: NORMAL. No further evaluation or treatment required.    CARDIOPULMONARY:   Electrocardiogram: The electrocardiogram (also referred to as ECG or EKG) is a non-invasive test that measures the electrical activity of the heart's conduction system.  o YOUR RESULTS: NORMAL. No further evaluation or treatment required.      CANCER SCREENING:   PSA: Prostate specific antigen (PSA) is a test used to screen for and monitor prostate cancer in men.  o YOUR RESULTS: NORMAL. No further evaluation or treatment required.      PHYSICAL EXAM  His body mass index is 24.29 kg/m². His  height is 5' 11" (1.803 m) and weight is 79 kg (174 lb 2.6 oz). His tympanic temperature is 96.1 °F (35.6 °C). His blood pressure is 119/79 and his pulse is 75. His oxygen saturation is 97%.    Physical Exam  Vitals signs " reviewed.   Constitutional:       General: He is not in acute distress.     Appearance: Normal appearance.   Eyes:      General: No scleral icterus.     Conjunctiva/sclera: Conjunctivae normal.   Neck:      Musculoskeletal: No muscular tenderness.      Vascular: No carotid bruit.   Cardiovascular:      Rate and Rhythm: Normal rate and regular rhythm.      Heart sounds: Normal heart sounds.   Pulmonary:      Effort: Pulmonary effort is normal. No respiratory distress.      Breath sounds: Normal breath sounds. No wheezing or rhonchi.   Abdominal:      General: Bowel sounds are normal. There is no distension.      Palpations: Abdomen is soft. There is no mass.      Tenderness: There is no abdominal tenderness.   Lymphadenopathy:      Cervical: No cervical adenopathy.   Skin:     General: Skin is warm and dry.      Coloration: Skin is not jaundiced.   Neurological:      General: No focal deficit present.      Mental Status: He is alert and oriented to person, place, and time.   Psychiatric:         Mood and Affect: Mood normal.         Behavior: Behavior normal.         Judgment: Judgment normal.         MEDICATIONS  He has a current medication list which includes the following prescription(s): cetirizine, fluticasone propionate, aspirin, bupropion, clonazepam, and varicella-zoster [id number] (pf).  Medications Discontinued During This Encounter   Medication Reason    buPROPion (WELLBUTRIN XL) 150 MG TB24 tablet Dose adjustment     Medications Ordered This Encounter   Medications    buPROPion (WELLBUTRIN XL) 300 MG 24 hr tablet     Sig: Take 1 tablet (300 mg total) by mouth once daily.     Dispense:  90 tablet     Refill:  4    clonazePAM (KLONOPIN) 0.5 MG tablet     Sig: Take 0.5-1 tablets (0.25-0.5 mg total) by mouth nightly as needed for Anxiety.     Dispense:  30 tablet     Refill:  2       REVIEW OF SYSTEMS  Review of Systems   Constitutional: Negative for chills and fever.   HENT: Negative for ear pain and  "trouble swallowing.    Eyes: Negative for pain and visual disturbance.   Respiratory: Negative for chest tightness and shortness of breath.    Cardiovascular: Negative for chest pain and palpitations.   Gastrointestinal: Negative for abdominal pain and blood in stool.   Endocrine: Negative for polydipsia and polyuria.   Genitourinary: Negative.  Negative for difficulty urinating, dysuria and hematuria.   Musculoskeletal: Negative for gait problem and joint swelling.   Integumentary:  Negative for rash and wound.   Neurological: Negative for vertigo and syncope.   Hematological: Negative.    Psychiatric/Behavioral: Positive for dysphoric mood. Negative for agitation, confusion, self-injury and suicidal ideas. The patient is nervous/anxious.        FOLLOW-UP  He is to follow up with me for any "Health Maintenance Interventions - Due Or Due Soon" listed above, for any unresolved issues addressed during this encounter, and for any new complaints or concerns.    PAST MEDICAL HISTORY  He has a past medical history of Allergy, Benign paroxysmal positional vertigo of left ear, Elevated coronary artery calcium score, Herpes genitalia, Kidney stones, Moderate mixed hyperlipidemia not requiring statin therapy, Seasonal allergic rhinitis due to pollen, and Vestibular nerve disease, left.    SURGICAL HISTORY  He has a past surgical history that includes Colonoscopy (N/A, 8/18/2017); Lithotripsy (Bilateral); and excision of throat lesion.    FAMILY HISTORY  His family history includes Cancer in his mother; Dementia in his father; Heart disease (age of onset: 70) in his father.     ALLERGIES  He has No Known Allergies.    SOCIAL HISTORY   TOBACCO USE HISTORY: He reports that he quit smoking about 14 years ago. His smoking use included cigarettes. He started smoking about 33 years ago. He has a 5.00 pack-year smoking history. He has never used smokeless tobacco.   ALCOHOL USE HISTORY:  He reports current alcohol " "use.    Documentation entered by me for this encounter may have been done in part using speech-recognition technology. Although I have made an effort to ensure accuracy, "sound like" errors may exist and should be interpreted in context. -CECILIA Preston MD.   "

## 2020-09-30 NOTE — ASSESSMENT & PLAN NOTE
Exacerbated by COVID-19 pandemic and work-life stressors. Modest improvement with bupropion 150 mg daily. He reports that he is tolerating the medication well and reports preceiving no adverse side-effects.

## 2020-09-30 NOTE — ASSESSMENT & PLAN NOTE
Primarily difficult maintaining sleep due to anxiety. Worse due to COVID-19 pandemic and work-life stressors. Seeing LCSW for CBT. We discussed treatment options.

## 2020-09-30 NOTE — ASSESSMENT & PLAN NOTE
Lab Results   Component Value Date    CHOL 183 09/30/2020    CHOL 192 09/09/2019    TRIG 173 (H) 09/30/2020    TRIG 170 (H) 09/09/2019    HDL 38 (L) 09/30/2020    HDL 34 (L) 09/09/2019    LDLCALC 110.4 09/30/2020    LDLCALC 124.0 09/09/2019    NONHDLCHOL 145 09/30/2020    NONHDLCHOL 158 09/09/2019    AST 17 09/30/2020    ALT 20 09/30/2020     The 10-year ASCVD risk score (Christinamiryam IVORY Jr., et al., 2013) is: 5.2%    Values used to calculate the score:      Age: 55 years      Sex: Male      Is Non- : No      Diabetic: No      Tobacco smoker: No      Systolic Blood Pressure: 119 mmHg      Is BP treated: No      HDL Cholesterol: 38 mg/dL      Total Cholesterol: 183 mg/dL  Therapeutic lifestyle changes encouraged.

## 2020-10-01 ENCOUNTER — TELEPHONE (OUTPATIENT)
Dept: ORTHOPEDICS | Facility: CLINIC | Age: 55
End: 2020-10-01

## 2020-10-01 LAB — HCV AB SERPL QL IA: NEGATIVE

## 2020-10-02 ENCOUNTER — TELEPHONE (OUTPATIENT)
Dept: ORTHOPEDICS | Facility: CLINIC | Age: 55
End: 2020-10-02

## 2020-10-05 NOTE — PROGRESS NOTES
"Emiliano Martinez.    I'm happy to report that these results are fine and do not require a change in treatment.    Thanks for letting me care for you, thanks for trusting us with your healthcare needs, and thanks for using [facility].    Sincerely,    CECILIA Preston MD  --------------------------------------------------------------------------------   Want to learn more about your test results and what they mean?  (1) Log in to your [facility] account at [email or web address].  (2) From the "View test results" tab, click on the test you want to know more about.  (3) Click on the "About This Test" link."

## 2020-10-05 NOTE — PROGRESS NOTES
"Emiliano Martinez.    These results are OK and do not require a change in treatment right now. We can discuss your test results in detail at your next appointment.    Thanks for letting me care for you, thanks for trusting us with your healthcare needs, and thanks for using [facility].    Sincerely,    CECILIA Preston MD  --------------------------------------------------------------------------------   Want to learn more about your test results and what they mean?  (1) Log in to your [facility] account at [email or web address].  (2) From the "View test results" tab, click on the test you want to know more about.  (3) Click on the "About This Test" link."

## 2020-10-21 ENCOUNTER — TELEPHONE (OUTPATIENT)
Dept: INTERNAL MEDICINE | Facility: CLINIC | Age: 55
End: 2020-10-21

## 2020-10-21 ENCOUNTER — PATIENT MESSAGE (OUTPATIENT)
Dept: INTERNAL MEDICINE | Facility: CLINIC | Age: 55
End: 2020-10-21

## 2020-10-21 DIAGNOSIS — Z20.822 EXPOSURE TO COVID-19 VIRUS: Primary | ICD-10-CM

## 2020-10-21 NOTE — TELEPHONE ENCOUNTER
----- Message from Sumi Pack sent at 10/21/2020  9:53 AM CDT -----  Contact: [phone]  Type:  Needs Medical Advice    Who Called: patient  Symptoms (please be specific):    How long has patient had these symptoms:    Pharmacy name and phone #:    Would the patient rather a call back or a response via [facility]? Call back  Best Call Back Number: [phone]  Additional Information: Patient would like to reschedule Covid test      Swetha/ALEXA

## 2020-10-21 NOTE — TELEPHONE ENCOUNTER
I spoke with the pt , he is still wanting to come in for another covid test. When he got the rapid test done he had only been experiencing symptoms yesterday 10/20. Pt was advised to self isolate until further results are given

## 2020-10-21 NOTE — TELEPHONE ENCOUNTER
Spoke with pt regarding message patient left about getting tested for Covid.Patient inquired about wife safety.Informe patient to take precaution.Patient stated that she understand.//LB

## 2020-10-21 NOTE — TELEPHONE ENCOUNTER
Spoke with pt and he stated that he received a rapid test from a different location. Pt wants to know if he still needed to come in for his Covid test here at [facility]. Please advise.

## 2020-10-23 ENCOUNTER — PATIENT MESSAGE (OUTPATIENT)
Dept: INTERNAL MEDICINE | Facility: CLINIC | Age: 55
End: 2020-10-23

## 2020-10-23 ENCOUNTER — LAB VISIT (OUTPATIENT)
Dept: INTERNAL MEDICINE | Facility: CLINIC | Age: 55
End: 2020-10-23
Payer: COMMERCIAL

## 2020-10-23 ENCOUNTER — LAB VISIT (OUTPATIENT)
Dept: LAB | Facility: HOSPITAL | Age: 55
End: 2020-10-23
Attending: FAMILY MEDICINE
Payer: COMMERCIAL

## 2020-10-23 DIAGNOSIS — Z20.822 EXPOSURE TO COVID-19 VIRUS: ICD-10-CM

## 2020-10-23 DIAGNOSIS — Z01.84 ENCOUNTER FOR ANTIBODY RESPONSE EXAMINATION: ICD-10-CM

## 2020-10-23 DIAGNOSIS — Z20.822 EXPOSURE TO COVID-19 VIRUS: Primary | ICD-10-CM

## 2020-10-23 LAB
SARS-COV-2 IGG SERPLBLD QL IA.RAPID: NEGATIVE
SARS-COV-2 RNA RESP QL NAA+PROBE: NOT DETECTED

## 2020-10-23 PROCEDURE — U0003 INFECTIOUS AGENT DETECTION BY NUCLEIC ACID (DNA OR RNA); SEVERE ACUTE RESPIRATORY SYNDROME CORONAVIRUS 2 (SARS-COV-2) (CORONAVIRUS DISEASE [COVID-19]), AMPLIFIED PROBE TECHNIQUE, MAKING USE OF HIGH THROUGHPUT TECHNOLOGIES AS DESCRIBED BY CMS-2020-01-R: HCPCS

## 2020-10-23 PROCEDURE — 36415 COLL VENOUS BLD VENIPUNCTURE: CPT

## 2020-10-23 PROCEDURE — 86769 SARS-COV-2 COVID-19 ANTIBODY: CPT

## 2020-10-23 NOTE — PROGRESS NOTES
DIAGNOSES  1. Exposure to COVID-19 virus    2. Encounter for antibody response examination        ORDERS  Orders Placed This Encounter    COVID-19 (SARS CoV-2) IgG Antibody

## 2020-10-24 NOTE — PROGRESS NOTES
Emiliano Martinez.    I'm happy to report that your test was NEGATIVE for COVID-19 (coronavirus).    If you still have symptoms, treat with rest, fluids, and over-the-counter medications. Remember to avoid large crowds and practice proper handwashing.     If your symptoms worsen or if you have any other concerns, please contact [facility] at [phone].    I hope you feel better soon.    Sincerely,    CECILIA Preston MD  ----------------------------------------  P.S. STAY SAFE and learn more about protecting yourself and others from COVID-19 (coronavirus) by visiting these web sites:     https://cdc.gov/coronavirus     [email or web address]

## 2020-10-24 NOTE — PROGRESS NOTES
Your COVID-19 (SARS CoV-2) IgG Antibody test result is NEGATIVE. This means that your body does not have antibodies against the SARS CoV-2 novel coronavirus (COVID-19). We would expect this result from someone who has NOT had the COVID-19 infection in the recent past. A negative test results means that you are NOT immune to COVID-19 and you can get the infection if exposed.  To learn more about protecting yourself and others from COVID-19, visit the web site of the CDC at https://cdc.gov/coronavirus.

## 2020-11-13 DIAGNOSIS — J30.1 SEASONAL ALLERGIC RHINITIS DUE TO POLLEN: ICD-10-CM

## 2020-11-18 RX ORDER — FLUTICASONE PROPIONATE 50 MCG
2 SPRAY, SUSPENSION (ML) NASAL DAILY
Qty: 47.4 ML | Refills: 4 | Status: SHIPPED | OUTPATIENT
Start: 2020-11-18

## 2020-11-18 RX ORDER — CETIRIZINE HYDROCHLORIDE 10 MG/1
10 TABLET ORAL DAILY
Qty: 90 TABLET | Refills: 4 | Status: SHIPPED | OUTPATIENT
Start: 2020-11-18 | End: 2021-11-18

## 2020-11-18 NOTE — TELEPHONE ENCOUNTER
REFILL APPROVED    Medications Ordered This Encounter   Medications    cetirizine (ZYRTEC) 10 MG tablet     Sig: Take 1 tablet (10 mg total) by mouth once daily.     Dispense:  90 tablet     Refill:  4    fluticasone propionate (FLONASE) 50 mcg/actuation nasal spray     Si sprays (100 mcg total) by Each Nostril route once daily.     Dispense:  47.4 mL     Refill:  4

## 2020-12-08 ENCOUNTER — OFFICE VISIT (OUTPATIENT)
Dept: URGENT CARE | Facility: CLINIC | Age: 55
End: 2020-12-08
Payer: COMMERCIAL

## 2020-12-08 ENCOUNTER — PATIENT MESSAGE (OUTPATIENT)
Dept: INTERNAL MEDICINE | Facility: CLINIC | Age: 55
End: 2020-12-08

## 2020-12-08 VITALS
DIASTOLIC BLOOD PRESSURE: 85 MMHG | RESPIRATION RATE: 18 BRPM | OXYGEN SATURATION: 97 % | TEMPERATURE: 98 F | HEART RATE: 91 BPM | SYSTOLIC BLOOD PRESSURE: 126 MMHG

## 2020-12-08 DIAGNOSIS — U07.1 COVID-19 VIRUS DETECTED: ICD-10-CM

## 2020-12-08 DIAGNOSIS — U07.1 COVID-19 VIRUS INFECTION: Primary | ICD-10-CM

## 2020-12-08 DIAGNOSIS — R05.9 COUGH: ICD-10-CM

## 2020-12-08 LAB
CTP QC/QA: YES
SARS-COV-2 RDRP RESP QL NAA+PROBE: POSITIVE

## 2020-12-08 PROCEDURE — 99213 OFFICE O/P EST LOW 20 MIN: CPT | Mod: S$GLB,,, | Performed by: PHYSICIAN ASSISTANT

## 2020-12-08 PROCEDURE — U0002 COVID-19 LAB TEST NON-CDC: HCPCS | Mod: QW,S$GLB,, | Performed by: PHYSICIAN ASSISTANT

## 2020-12-08 PROCEDURE — U0002: ICD-10-PCS | Mod: QW,S$GLB,, | Performed by: PHYSICIAN ASSISTANT

## 2020-12-08 PROCEDURE — 99213 PR OFFICE/OUTPT VISIT, EST, LEVL III, 20-29 MIN: ICD-10-PCS | Mod: S$GLB,,, | Performed by: PHYSICIAN ASSISTANT

## 2020-12-08 NOTE — LETTER
[address] ? [place], [address] ? Phone [phone] ? Fax             Return to Work/School    Patient: Flex Curtis  YOB: 1965   Date: 12/08/2020      To Whom It May Concern:     Flex Curtis was in contact with/seen in my office on 12/08/2020. COVID-19 is present in our communities across the state. Not all patients are eligible or appropriate to be tested. In this situation, your employee meets the following criteria:     Flex Curtis has met the criteria for COVID-19 testing and has a POSITIVE result. He can return to work once they are asymptomatic for 24 hours without the use of fever reducing medications AND at least ten days from the start of symptoms (or from the first positive result if they have no symptoms).      If you have any questions or concerns, or if I can be of further assistance, please do not hesitate to contact me.     Sincerely,    Helen Brush PA-C

## 2020-12-08 NOTE — PROGRESS NOTES
Subjective:       Patient ID: Flex Curtis is a 55 y.o. male.    Vitals:  temperature is 98.3 °F (36.8 °C). His blood pressure is 126/85 and his pulse is 91. His respiration is 18 and oxygen saturation is 97%.     Chief Complaint: COVID-19 Concerns    Pt states he has been having symptoms since Sunday. Pt unsure of COVID exposure.  Reports productive cough, low-grade fever, chills and body aches.  Patient has been taking NyQuil with little relief.  He denies any wheezing, shortness of breath, congestion, sore throat, diarrhea, nausea/vomiting.    Other  Associated symptoms include chills, coughing, a fever, headaches and myalgias. Pertinent negatives include no arthralgias, chest pain, congestion, fatigue, joint swelling, nausea, rash, sore throat, vertigo or vomiting.       Constitution: Positive for chills and fever. Negative for fatigue.   HENT: Negative for congestion and sore throat.    Neck: Negative for painful lymph nodes.   Cardiovascular: Negative for chest pain and leg swelling.   Eyes: Negative for double vision and blurred vision.   Respiratory: Positive for cough. Negative for shortness of breath.    Gastrointestinal: Negative for nausea, vomiting and diarrhea.   Genitourinary: Negative for dysuria, frequency and urgency.   Musculoskeletal: Positive for muscle ache. Negative for joint pain, joint swelling and muscle cramps.   Skin: Negative for color change, pale, rash and erythema.   Allergic/Immunologic: Negative for seasonal allergies.   Neurological: Positive for headaches. Negative for dizziness, history of vertigo, light-headedness and passing out.   Hematologic/Lymphatic: Negative for swollen lymph nodes, easy bruising/bleeding and history of blood clots. Does not bruise/bleed easily.   Psychiatric/Behavioral: Negative for nervous/anxious, sleep disturbance and depression. The patient is not nervous/anxious.        Objective:      Physical Exam   Constitutional: He is oriented to person,  place, and time. He appears well-developed.   HENT:   Head: Normocephalic and atraumatic. Head is without abrasion, without contusion and without laceration.   Ears:   Right Ear: External ear normal.   Left Ear: External ear normal.   Nose: Nose normal.   Mouth/Throat: Oropharynx is clear and moist and mucous membranes are normal.   Eyes: Pupils are equal, round, and reactive to light. Conjunctivae, EOM and lids are normal.   Neck: Trachea normal, full passive range of motion without pain and phonation normal. Neck supple.   Cardiovascular: Normal rate, regular rhythm and normal heart sounds.   Pulmonary/Chest: Effort normal and breath sounds normal. No stridor. No respiratory distress.   Musculoskeletal: Normal range of motion.   Neurological: He is alert and oriented to person, place, and time.   Skin: Skin is warm, dry, intact and no rash. Capillary refill takes less than 2 seconds. abrasion, burn, bruising, erythema and ecchymosisPsychiatric: His speech is normal and behavior is normal. Judgment and thought content normal.   Nursing note and vitals reviewed.    Results for orders placed or performed in visit on 12/08/20   POCT COVID-19 Rapid Screening   Result Value Ref Range    POC Rapid COVID Positive (A) Negative     Acceptable Yes            Assessment:       1. COVID-19 virus infection    2. Cough        Plan:       - Rapid COVID-19 positive  -VSS  - Advised patient to stay home and self quarantine until 10 days from onset of symptoms, and asymptomatic/fever free for at least 24 hours prior to resuming normal activity.  -Tylenol as needed for fever control. OTC medications prn for cold symptoms.   - Strict ED precautions given for any emergent symptoms.    COVID-19 virus infection    Cough  -     POCT COVID-19 Rapid Screening      Patient Instructions   You have tested positive for COVID-19 today. Please note that patients who test positive for COVID-19 are required by the CDC to undergo  isolation for 10 days after their symptoms first began.     This isolation starts from the day you first developed symptoms, and not the day of your positive test.  For example, if your symptoms began on Monday but you tested positive on the following Wednesday, the 10 days isolation begins from that Monday, not the Wednesday you tested positive.    However, if you are asymptomatic (a person he does not have any symptoms) and received a COVID-19 test that was positive, here 10 day isolation begins on the day you tested positive regardless of exposure date.    Also, per the CDC guidelines, once your 10 days have passed, and you have not had fever greater than 100.4° in the past 24 hrs without taking any fever reducers such as Tylenol (Acetaminophen) or Motrin (Ibuprofen) , you may return to your normal activities including social distancing, wearing masks, frequent hand washing- YOU DO NOT NEED ANOTHER TEST IN ORDER TO END YOUR QUARANTINE.

## 2020-12-08 NOTE — PATIENT INSTRUCTIONS
You have tested positive for COVID-19 today. Please note that patients who test positive for COVID-19 are required by the CDC to undergo isolation for 10 days after their symptoms first began.     This isolation starts from the day you first developed symptoms, and not the day of your positive test.  For example, if your symptoms began on Monday but you tested positive on the following Wednesday, the 10 days isolation begins from that Monday, not the Wednesday you tested positive.    However, if you are asymptomatic (a person he does not have any symptoms) and received a COVID-19 test that was positive, here 10 day isolation begins on the day you tested positive regardless of exposure date.    Also, per the CDC guidelines, once your 10 days have passed, and you have not had fever greater than 100.4° in the past 24 hrs without taking any fever reducers such as Tylenol (Acetaminophen) or Motrin (Ibuprofen) , you may return to your normal activities including social distancing, wearing masks, frequent hand washing- YOU DO NOT NEED ANOTHER TEST IN ORDER TO END YOUR QUARANTINE.

## 2020-12-15 ENCOUNTER — NURSE TRIAGE (OUTPATIENT)
Dept: ADMINISTRATIVE | Facility: CLINIC | Age: 55
End: 2020-12-15

## 2020-12-15 NOTE — TELEPHONE ENCOUNTER
Pt contacted for Sx tracker escalation - C/O increased cough. Denies any fever or SOB, pt able to speak in full sentences without noted SOB or cough. Covid 19 protocol used and pt advised on home care. Pt instructed to call OOC for worsening of symptoms or health questions.    Reason for Disposition   [1] COVID-19 infection diagnosed or suspected AND [2] mild symptoms (fever, cough) AND [3] no trouble breathing or other complications    Additional Information   Negative: Severe difficulty breathing (e.g., struggling for each breath, speaks in single words)   Negative: Difficult to awaken or acting confused (e.g., disoriented, slurred speech)   Negative: Bluish (or gray) lips or face now   Negative: Shock suspected (e.g., cold/pale/clammy skin, too weak to stand, low BP, rapid pulse)   Negative: Sounds like a life-threatening emergency to the triager   Negative: [1] COVID-19 suspected (e.g., cough, fever, shortness of breath) AND [2] mild symptoms AND [3] public health department recommends testing   Negative: [1] COVID-19 exposure AND [2] no symptoms   Negative: COVID-19 and Breastfeeding, questions about   Negative: SEVERE or constant chest pain (Exception: mild central chest pain, present only when coughing)   Negative: MODERATE difficulty breathing (e.g., speaks in phrases, SOB even at rest, pulse 100-120)   Negative: Patient sounds very sick or weak to the triager   Negative: MILD difficulty breathing (e.g., minimal/no SOB at rest, SOB with walking, pulse <100)   Negative: Chest pain   Negative: Fever > 103 F (39.4 C)   Negative: [1] Fever > 101 F (38.3 C) AND [2] age > 60   Negative: [1] Fever > 100.0 F (37.8 C) AND [2] bedridden (e.g., nursing home patient, CVA, chronic illness, recovering from surgery)   Negative: HIGH RISK patient (e.g., age > 64 years, diabetes, heart or lung disease, weak immune system)   Negative: Fever present > 3 days (72 hours)   Negative: [1] Fever returns after  gone for over 24 hours AND [2] symptoms worse or not improved   Negative: [1] Continuous (nonstop) coughing interferes with work or school AND [2] no improvement using cough treatment per protocol   Negative: Cough present > 3 weeks    Protocols used: CORONAVIRUS (COVID-19) - DIAGNOSED OR [id number]

## 2021-01-12 ENCOUNTER — OFFICE VISIT (OUTPATIENT)
Dept: ORTHOPEDICS | Facility: CLINIC | Age: 56
End: 2021-01-12
Payer: COMMERCIAL

## 2021-01-12 VITALS — HEIGHT: 71 IN | BODY MASS INDEX: 24.36 KG/M2 | WEIGHT: 174 LBS

## 2021-01-12 DIAGNOSIS — M67.441 GANGLION CYST OF FINGER OF RIGHT HAND: Primary | ICD-10-CM

## 2021-01-12 PROCEDURE — 99999 PR PBB SHADOW E&M-EST. PATIENT-LVL III: CPT | Mod: PBBFAC,,, | Performed by: STUDENT IN AN ORGANIZED HEALTH CARE EDUCATION/TRAINING PROGRAM

## 2021-01-12 PROCEDURE — 99203 PR OFFICE/OUTPT VISIT, NEW, LEVL III, 30-44 MIN: ICD-10-PCS | Mod: S$GLB,,, | Performed by: STUDENT IN AN ORGANIZED HEALTH CARE EDUCATION/TRAINING PROGRAM

## 2021-01-12 PROCEDURE — 99203 OFFICE O/P NEW LOW 30 MIN: CPT | Mod: S$GLB,,, | Performed by: STUDENT IN AN ORGANIZED HEALTH CARE EDUCATION/TRAINING PROGRAM

## 2021-01-12 PROCEDURE — 1125F AMNT PAIN NOTED PAIN PRSNT: CPT | Mod: S$GLB,,, | Performed by: STUDENT IN AN ORGANIZED HEALTH CARE EDUCATION/TRAINING PROGRAM

## 2021-01-12 PROCEDURE — 3008F PR BODY MASS INDEX (BMI) DOCUMENTED: ICD-10-PCS | Mod: CPTII,S$GLB,, | Performed by: STUDENT IN AN ORGANIZED HEALTH CARE EDUCATION/TRAINING PROGRAM

## 2021-01-12 PROCEDURE — 99999 PR PBB SHADOW E&M-EST. PATIENT-LVL III: ICD-10-PCS | Mod: PBBFAC,,, | Performed by: STUDENT IN AN ORGANIZED HEALTH CARE EDUCATION/TRAINING PROGRAM

## 2021-01-12 PROCEDURE — 3008F BODY MASS INDEX DOCD: CPT | Mod: CPTII,S$GLB,, | Performed by: STUDENT IN AN ORGANIZED HEALTH CARE EDUCATION/TRAINING PROGRAM

## 2021-01-12 PROCEDURE — 1125F PR PAIN SEVERITY QUANTIFIED, PAIN PRESENT: ICD-10-PCS | Mod: S$GLB,,, | Performed by: STUDENT IN AN ORGANIZED HEALTH CARE EDUCATION/TRAINING PROGRAM

## 2021-01-20 DIAGNOSIS — J30.1 SEASONAL ALLERGIC RHINITIS DUE TO POLLEN: ICD-10-CM

## 2021-01-20 RX ORDER — FLUTICASONE PROPIONATE 50 MCG
2 SPRAY, SUSPENSION (ML) NASAL DAILY
Qty: 47.4 ML | Refills: 4 | OUTPATIENT
Start: 2021-01-20

## 2021-02-03 ENCOUNTER — OFFICE VISIT (OUTPATIENT)
Dept: ORTHOPEDICS | Facility: CLINIC | Age: 56
End: 2021-02-03
Payer: COMMERCIAL

## 2021-02-03 VITALS
HEIGHT: 71 IN | WEIGHT: 174 LBS | HEART RATE: 78 BPM | DIASTOLIC BLOOD PRESSURE: 88 MMHG | BODY MASS INDEX: 24.36 KG/M2 | SYSTOLIC BLOOD PRESSURE: 126 MMHG

## 2021-02-03 DIAGNOSIS — M67.441 GANGLION CYST OF FINGER OF RIGHT HAND: Primary | ICD-10-CM

## 2021-02-03 DIAGNOSIS — Z01.818 PREOP TESTING: Primary | ICD-10-CM

## 2021-02-03 PROCEDURE — 99999 PR PBB SHADOW E&M-EST. PATIENT-LVL III: ICD-10-PCS | Mod: PBBFAC,,, | Performed by: ORTHOPAEDIC SURGERY

## 2021-02-03 PROCEDURE — 99999 PR PBB SHADOW E&M-EST. PATIENT-LVL III: CPT | Mod: PBBFAC,,, | Performed by: ORTHOPAEDIC SURGERY

## 2021-02-03 PROCEDURE — 1125F PR PAIN SEVERITY QUANTIFIED, PAIN PRESENT: ICD-10-PCS | Mod: S$GLB,,, | Performed by: ORTHOPAEDIC SURGERY

## 2021-02-03 PROCEDURE — 3008F BODY MASS INDEX DOCD: CPT | Mod: CPTII,S$GLB,, | Performed by: ORTHOPAEDIC SURGERY

## 2021-02-03 PROCEDURE — 1125F AMNT PAIN NOTED PAIN PRSNT: CPT | Mod: S$GLB,,, | Performed by: ORTHOPAEDIC SURGERY

## 2021-02-03 PROCEDURE — 99213 OFFICE O/P EST LOW 20 MIN: CPT | Mod: S$GLB,,, | Performed by: ORTHOPAEDIC SURGERY

## 2021-02-03 PROCEDURE — 99213 PR OFFICE/OUTPT VISIT, EST, LEVL III, 20-29 MIN: ICD-10-PCS | Mod: S$GLB,,, | Performed by: ORTHOPAEDIC SURGERY

## 2021-02-03 PROCEDURE — 3008F PR BODY MASS INDEX (BMI) DOCUMENTED: ICD-10-PCS | Mod: CPTII,S$GLB,, | Performed by: ORTHOPAEDIC SURGERY

## 2021-03-08 ENCOUNTER — OFFICE VISIT (OUTPATIENT)
Dept: INTERNAL MEDICINE | Facility: CLINIC | Age: 56
End: 2021-03-08
Payer: COMMERCIAL

## 2021-03-08 DIAGNOSIS — F51.04 PSYCHOPHYSIOLOGIC INSOMNIA: ICD-10-CM

## 2021-03-08 DIAGNOSIS — F41.1 GENERALIZED ANXIETY DISORDER: ICD-10-CM

## 2021-03-08 DIAGNOSIS — L02.422 FURUNCLE OF LEFT AXILLA: Primary | ICD-10-CM

## 2021-03-08 PROCEDURE — 99214 PR OFFICE/OUTPT VISIT, EST, LEVL IV, 30-39 MIN: ICD-10-PCS | Mod: 95,,, | Performed by: FAMILY MEDICINE

## 2021-03-08 PROCEDURE — 99214 OFFICE O/P EST MOD 30 MIN: CPT | Mod: 95,,, | Performed by: FAMILY MEDICINE

## 2021-03-08 RX ORDER — SULFAMETHOXAZOLE AND TRIMETHOPRIM 800; 160 MG/1; MG/1
1 TABLET ORAL 2 TIMES DAILY
Qty: 14 TABLET | Refills: 0 | Status: SHIPPED | OUTPATIENT
Start: 2021-03-08 | End: 2021-03-15

## 2021-03-08 RX ORDER — CLONAZEPAM 0.5 MG/1
.25-.5 TABLET ORAL NIGHTLY PRN
Qty: 30 TABLET | Refills: 2 | Status: SHIPPED | OUTPATIENT
Start: 2021-03-08 | End: 2022-03-08

## 2021-11-30 DIAGNOSIS — F33.0 MILD EPISODE OF RECURRENT MAJOR DEPRESSIVE DISORDER: ICD-10-CM

## 2021-11-30 DIAGNOSIS — F41.1 GENERALIZED ANXIETY DISORDER: ICD-10-CM

## 2021-12-03 RX ORDER — BUPROPION HYDROCHLORIDE 300 MG/1
TABLET ORAL
Qty: 90 TABLET | Refills: 0 | Status: SHIPPED | OUTPATIENT
Start: 2021-12-03

## 2022-02-07 DIAGNOSIS — F33.0 MILD EPISODE OF RECURRENT MAJOR DEPRESSIVE DISORDER: ICD-10-CM

## 2022-02-07 DIAGNOSIS — F41.1 GENERALIZED ANXIETY DISORDER: ICD-10-CM

## 2022-02-07 RX ORDER — BUPROPION HYDROCHLORIDE 300 MG/1
TABLET ORAL
Qty: 90 TABLET | Refills: 0 | OUTPATIENT
Start: 2022-02-07

## 2022-02-07 NOTE — TELEPHONE ENCOUNTER
Care Due:                  Date            Visit Type   Department     Provider  --------------------------------------------------------------------------------                                ESTABLISHED                              PATIENT -    [facility]  Last Visit: 03-      [facility]       Goran Preston  Next Visit: None Scheduled  None         None Found                                                            Last  Test          Frequency    Reason                     Performed    Due Date  --------------------------------------------------------------------------------    Office Visit  12 months..  buPROPion................  03- 03-    Powered by [employer]. Reference number: [id number].   2/07/2022 12:37:40 PM CST

## 2022-02-08 NOTE — TELEPHONE ENCOUNTER
LAST VISIT WITH ME:  3/8/2021     TO MY TEAM:    Please notify him I am unable to give him any refills until he comes in for appointment.   Emphasize: APPOINTMENT REQUIRED FOR REFILLS.   Schedule him for first available regular appointment and encourage him to get on wait-list.   After he has scheduled his appointment, he can re-submit this refill request.    REFILL REFUSED - APPOINTMENT REQUIRED  Requested Prescriptions   Pending Prescriptions Disp Refills    buPROPion (WELLBUTRIN XL) 300 MG 24 hr tablet [Pharmacy Med Name: BUPROPION HCL  MG TABLET] 90 tablet 0     Sig: TAKE 1 TABLET BY MOUTH EVERY DAY     #LMRX

## 2022-02-16 ENCOUNTER — PATIENT OUTREACH (OUTPATIENT)
Dept: ADMINISTRATIVE | Facility: HOSPITAL | Age: 57
End: 2022-02-16
Payer: COMMERCIAL

## 2022-02-16 NOTE — PROGRESS NOTES
FLU vaccination records received and uploaded into [email or web address]-  Unable to reconcile immunization.Records not pulling over. Chart cc'd LPN-CC

## 2022-03-23 ENCOUNTER — PATIENT MESSAGE (OUTPATIENT)
Dept: RESEARCH | Facility: HOSPITAL | Age: 57
End: 2022-03-23
Payer: COMMERCIAL

## 2022-08-12 ENCOUNTER — PATIENT OUTREACH (OUTPATIENT)
Dept: ADMINISTRATIVE | Facility: HOSPITAL | Age: 57
End: 2022-08-12
Payer: COMMERCIAL